# Patient Record
Sex: MALE | Race: WHITE | Employment: OTHER | URBAN - METROPOLITAN AREA
[De-identification: names, ages, dates, MRNs, and addresses within clinical notes are randomized per-mention and may not be internally consistent; named-entity substitution may affect disease eponyms.]

---

## 2019-06-27 ENCOUNTER — OFFICE VISIT (OUTPATIENT)
Dept: ENDOCRINOLOGY CLINIC | Facility: CLINIC | Age: 59
End: 2019-06-27
Payer: COMMERCIAL

## 2019-06-27 VITALS — WEIGHT: 184.38 LBS | SYSTOLIC BLOOD PRESSURE: 122 MMHG | DIASTOLIC BLOOD PRESSURE: 57 MMHG | HEART RATE: 95 BPM

## 2019-06-27 DIAGNOSIS — E10.65 UNCONTROLLED TYPE 1 DIABETES MELLITUS WITH HYPERGLYCEMIA (HCC): Primary | ICD-10-CM

## 2019-06-27 PROBLEM — E11.65 UNCONTROLLED TYPE 2 DIABETES MELLITUS WITH HYPERGLYCEMIA (HCC): Status: ACTIVE | Noted: 2019-06-27

## 2019-06-27 PROCEDURE — 36416 COLLJ CAPILLARY BLOOD SPEC: CPT | Performed by: INTERNAL MEDICINE

## 2019-06-27 PROCEDURE — 83036 HEMOGLOBIN GLYCOSYLATED A1C: CPT | Performed by: INTERNAL MEDICINE

## 2019-06-27 PROCEDURE — 82962 GLUCOSE BLOOD TEST: CPT | Performed by: INTERNAL MEDICINE

## 2019-06-27 PROCEDURE — 99205 OFFICE O/P NEW HI 60 MIN: CPT | Performed by: INTERNAL MEDICINE

## 2019-06-27 RX ORDER — HUMAN INSULIN 100 [IU]/ML
INJECTION, SOLUTION SUBCUTANEOUS
Refills: 6 | COMMUNITY
Start: 2019-04-30 | End: 2019-06-27

## 2019-06-27 RX ORDER — GEMFIBROZIL 600 MG/1
600 TABLET, FILM COATED ORAL
Qty: 180 TABLET | Refills: 1 | Status: SHIPPED | OUTPATIENT
Start: 2019-06-27 | End: 2019-10-25

## 2019-06-27 RX ORDER — HUMAN INSULIN 100 [IU]/ML
40 INJECTION, SUSPENSION SUBCUTANEOUS NIGHTLY
Refills: 6 | COMMUNITY
Start: 2019-04-27 | End: 2019-06-27

## 2019-06-27 RX ORDER — FLASH GLUCOSE SENSOR
1 KIT MISCELLANEOUS CONTINUOUS
Qty: 2 EACH | Refills: 5 | Status: SHIPPED | OUTPATIENT
Start: 2019-06-27 | End: 2021-07-16

## 2019-06-27 RX ORDER — SYRINGE-NEEDLE,INSULIN,0.5 ML 29 GAUGE
SYRINGE, EMPTY DISPOSABLE MISCELLANEOUS
Refills: 0 | COMMUNITY
Start: 2019-02-26 | End: 2019-06-27

## 2019-06-27 RX ORDER — GLIMEPIRIDE 4 MG/1
4 TABLET ORAL
COMMUNITY
End: 2019-06-27

## 2019-06-27 RX ORDER — GEMFIBROZIL 600 MG/1
600 TABLET, FILM COATED ORAL
COMMUNITY
End: 2019-06-27

## 2019-06-27 RX ORDER — MINOCYCLINE HYDROCHLORIDE 100 MG/1
100 CAPSULE ORAL 2 TIMES DAILY
COMMUNITY

## 2019-06-27 RX ORDER — IRBESARTAN 150 MG/1
150 TABLET ORAL NIGHTLY
COMMUNITY
End: 2019-06-27

## 2019-06-28 ENCOUNTER — TELEPHONE (OUTPATIENT)
Dept: ENDOCRINOLOGY CLINIC | Facility: CLINIC | Age: 59
End: 2019-06-28

## 2019-06-28 NOTE — PATIENT INSTRUCTIONS
STOP Metformin, Regular, NPH    Tresiba 25 units SQ QHS    Novolog  INSULIN SLIDING SCALE  Base Values  Breakfast: 12  Lunch: 12  Dinner: 16  Ranges:  80-99: -2  100-119: 0  120-139: 0  140-159: 0  160-179: 1  180-199: 1  200-219: 1  220-239: 2  240-259: 2

## 2019-06-28 NOTE — TELEPHONE ENCOUNTER
LMTCB -Called pt to inquire how Verna Castillo was working. SH would like him to connect w/ clinic.  Instructions provided at [address] [place] [address]

## 2019-06-28 NOTE — PROGRESS NOTES
Name: Kristina Up  Date: 6/27/2019    Referring Physician: No ref. provider found    HISTORY OF PRESENT ILLNESS   Kristina Up is a 60year old male who presents for diabetes mellitus diagnosed in 1997.   He was diagnosed with diabetes after severe pancreati HCl 100 MG Oral Cap, Take 100 mg by mouth 2 (two) times daily. , Disp: , Rfl:   •  Pancrelipase, Lip-Prot-Amyl, (CREON) 6000 units Oral Cap DR Particles, Take by mouth as needed. , Disp: , Rfl:      Allergies:     Radiology Contrast *    HIVES  Sulfur-Salicy importance of low CHO diet, recommend 45gm per meal or 135gm per day  -Provided patient education materials  -Provided blood glucose log book  -d/c NPH, Regular and Metformin  -Start Tresiba 25 units SQ QHS  -Start Novolog 12-12-16 units SQ TID with meals

## 2019-07-10 NOTE — TELEPHONE ENCOUNTER
Dr Taylor Sensing [facility]) called w/ update    Not wearing Dara Pro - fell of after 36 hrs. Pt is  and sweats at his job. Pt is back to using Insulin R and Insulin N OTC.    [facility]ospitals in [place] states [place] and Novolog insulin cost over $400 so he is not doing it

## 2019-07-11 NOTE — TELEPHONE ENCOUNTER
Lets at least call the pharmacy to try running the coupon card or is there an alternative that is better covered? If the insulin is affordable then there won't be as many barriers to take the medication. Thanks.

## 2019-07-12 RX ORDER — INSULIN GLARGINE 100 [IU]/ML
25 INJECTION, SOLUTION SUBCUTANEOUS NIGHTLY
Qty: 22.5 ML | Refills: 0 | Status: SHIPPED | OUTPATIENT
Start: 2019-07-12 | End: 2019-07-12

## 2019-07-12 NOTE — TELEPHONE ENCOUNTER
Pharmacy returned call. Lantus costs $557  Can dispense Basaglar and use copay card. Pharmacy will call patient about activating copay card.

## 2019-07-12 NOTE — TELEPHONE ENCOUNTER
Becca Shipley confirmed Novolog is $60 for 90 day supply. [place] more costly at $176 for 60 day supply. RN sent over alternative long acting insulin to see if we can get it less expensive for patient.     Will await karlene back from pharmacy

## 2019-08-07 ENCOUNTER — TELEPHONE (OUTPATIENT)
Dept: ENDOCRINOLOGY CLINIC | Facility: CLINIC | Age: 59
End: 2019-08-07

## 2019-08-07 NOTE — TELEPHONE ENCOUNTER
Pt would like to know if he should push his appt back a little due to new medication ? States instead of sep should he be seen in October ? Pt states novalog and lipitor are working beautifully together.      12/12/18 is how he is taking   Lipitor 25-30

## 2019-08-09 NOTE — TELEPHONE ENCOUNTER
I'm confused - maybe he means lantus and Novolog? I'm glad it is working well and ok to move appt to October since he started medications later. Thanks.

## 2019-08-09 NOTE — TELEPHONE ENCOUNTER
Spoke with pt confirmed he mean Lantus and Novolog. Relayed Dr. Radu Ochoa message. Appt rescheduled to 10/25/19 at 0800. Pt agreed to appt scheduled. No further action required at this time.

## 2019-08-22 ENCOUNTER — TELEPHONE (OUTPATIENT)
Dept: ENDOCRINOLOGY CLINIC | Facility: CLINIC | Age: 59
End: 2019-08-22

## 2019-08-22 NOTE — TELEPHONE ENCOUNTER
RN spoke with patient who confirms he is still taking Lantus and Novolog and doing \"good\" Tanja Matson is does what he is supposed to\". Pt states he \"ate like a pig\" and sugar this morning was 343. PT confirms he corrected w/ Novolog this morning.   VY Arita

## 2019-10-21 ENCOUNTER — LAB ENCOUNTER (OUTPATIENT)
Dept: LAB | Facility: HOSPITAL | Age: 59
End: 2019-10-21
Attending: INTERNAL MEDICINE
Payer: COMMERCIAL

## 2019-10-21 DIAGNOSIS — E10.65 UNCONTROLLED TYPE 1 DIABETES MELLITUS WITH HYPERGLYCEMIA (HCC): ICD-10-CM

## 2019-10-21 PROCEDURE — 85025 COMPLETE CBC W/AUTO DIFF WBC: CPT

## 2019-10-21 PROCEDURE — 80053 COMPREHEN METABOLIC PANEL: CPT

## 2019-10-21 PROCEDURE — 36415 COLL VENOUS BLD VENIPUNCTURE: CPT

## 2019-10-21 PROCEDURE — 82043 UR ALBUMIN QUANTITATIVE: CPT

## 2019-10-21 PROCEDURE — 84443 ASSAY THYROID STIM HORMONE: CPT

## 2019-10-21 PROCEDURE — 80061 LIPID PANEL: CPT

## 2019-10-21 PROCEDURE — 82570 ASSAY OF URINE CREATININE: CPT

## 2019-10-25 ENCOUNTER — OFFICE VISIT (OUTPATIENT)
Dept: ENDOCRINOLOGY CLINIC | Facility: CLINIC | Age: 59
End: 2019-10-25
Payer: COMMERCIAL

## 2019-10-25 VITALS — HEART RATE: 79 BPM | DIASTOLIC BLOOD PRESSURE: 80 MMHG | WEIGHT: 174 LBS | SYSTOLIC BLOOD PRESSURE: 140 MMHG

## 2019-10-25 DIAGNOSIS — E10.65 UNCONTROLLED TYPE 1 DIABETES MELLITUS WITH HYPERGLYCEMIA (HCC): Primary | ICD-10-CM

## 2019-10-25 PROCEDURE — 36416 COLLJ CAPILLARY BLOOD SPEC: CPT | Performed by: INTERNAL MEDICINE

## 2019-10-25 PROCEDURE — 82962 GLUCOSE BLOOD TEST: CPT | Performed by: INTERNAL MEDICINE

## 2019-10-25 PROCEDURE — 83036 HEMOGLOBIN GLYCOSYLATED A1C: CPT | Performed by: INTERNAL MEDICINE

## 2019-10-25 PROCEDURE — 99214 OFFICE O/P EST MOD 30 MIN: CPT | Performed by: INTERNAL MEDICINE

## 2019-10-25 RX ORDER — GEMFIBROZIL 600 MG/1
600 TABLET, FILM COATED ORAL
Qty: 180 TABLET | Refills: 1 | Status: SHIPPED | OUTPATIENT
Start: 2019-10-25 | End: 2021-07-16

## 2019-10-25 NOTE — PATIENT INSTRUCTIONS
Increase Basaglar to 35 units SQ daily    Continue Novolog 12 units SQ with breakfast, lunch and 20 units SQ dinner

## 2019-10-25 NOTE — PROGRESS NOTES
Name: Maureen Paul  Date: 10/25/2019    Referring Physician: No ref. provider found    HISTORY OF PRESENT ILLNESS   Maureen Paul is a 60year old male who presents for diabetes mellitus diagnosed in 1997.   He was diagnosed with diabetes after severe pancreat 12 Units into the skin 3 (three) times daily before meals. (Patient taking differently: Inject 12 Units into the skin 3 (three) times daily before meals.  Pt adjusts insulin depending on food. ), Disp: 30 mL, Rfl: 3  •  Insulin Pen Needle (BD PEN NEEDLE NAN Uncontrolled  -Uncontrolled, HgA1c 9.4% -->significantly improved   -Discussed importance of glycemic control to prevent complications of diabetes  -Discussed complications of diabetes include retinopathy, neuropathy, nephropathy and cardiovascular disease

## 2020-01-21 ENCOUNTER — TELEPHONE (OUTPATIENT)
Dept: ENDOCRINOLOGY CLINIC | Facility: CLINIC | Age: 60
End: 2020-01-21

## 2020-01-21 RX ORDER — INSULIN GLARGINE 100 [IU]/ML
35 INJECTION, SOLUTION SUBCUTANEOUS NIGHTLY
Qty: 33 ML | Refills: 0 | Status: SHIPPED | OUTPATIENT
Start: 2020-01-21 | End: 2020-01-23

## 2020-01-22 ENCOUNTER — TELEPHONE (OUTPATIENT)
Dept: ENDOCRINOLOGY CLINIC | Facility: CLINIC | Age: 60
End: 2020-01-22

## 2020-01-22 RX ORDER — INSULIN GLARGINE 100 [IU]/ML
35 INJECTION, SOLUTION SUBCUTANEOUS NIGHTLY
Qty: 33 ML | Refills: 0 | Status: SHIPPED | OUTPATIENT
Start: 2020-01-22 | End: 2020-01-23

## 2020-01-22 NOTE — TELEPHONE ENCOUNTER
Basaglar too expensive.  [facility], per patient insurance preferred brand are Lantus and Toujeo and Levemir  Please advise?

## 2020-01-22 NOTE — TELEPHONE ENCOUNTER
Pt states that I Just Shared will cost $920.07 and he can't afford. Please advise if something can be substituted.

## 2020-01-22 NOTE — TELEPHONE ENCOUNTER
LOV; 10/25/19    PLEASE ADVISE:    CURRENTLY on Basaglar 35 u nightly   Ins prefers Lantus . Is this ok?

## 2020-01-22 NOTE — TELEPHONE ENCOUNTER
Current Outpatient Medications   Medication Sig Dispense Refill   • BASAGLAR KWIKPEN 100 UNIT/ML Subcutaneous Solution Pen-injector Inject 35 Units into the skin nightly.  33 mL 0     Per pharmacy Insurance prefers Lantus pls clarify

## 2020-01-23 RX ORDER — INSULIN GLARGINE 300 U/ML
35 INJECTION, SOLUTION SUBCUTANEOUS DAILY
Qty: 13.5 ML | Refills: 0 | Status: SHIPPED | OUTPATIENT
Start: 2020-01-23 | End: 2020-02-28

## 2020-01-23 NOTE — TELEPHONE ENCOUNTER
Awais Wyman sent per [facility] written. Pharm states cost is +$700. Pt may have deducible w/ commercial insurance - will try savings card. RN advised pt to use savings card (info given over phone) by calling number on card to activate.  Pt is agreeable and stat

## 2020-01-30 NOTE — TELEPHONE ENCOUNTER
New Toujeo Max coupon card was activated today by pharm. Pharm will call pt to p/u when ready.     Routed to [facility] as Randa Gan

## 2020-02-28 ENCOUNTER — OFFICE VISIT (OUTPATIENT)
Dept: ENDOCRINOLOGY CLINIC | Facility: CLINIC | Age: 60
End: 2020-02-28
Payer: COMMERCIAL

## 2020-02-28 VITALS — WEIGHT: 177 LBS | HEART RATE: 75 BPM | DIASTOLIC BLOOD PRESSURE: 71 MMHG | SYSTOLIC BLOOD PRESSURE: 133 MMHG

## 2020-02-28 DIAGNOSIS — E10.65 UNCONTROLLED TYPE 1 DIABETES MELLITUS WITH HYPERGLYCEMIA (HCC): Primary | ICD-10-CM

## 2020-02-28 LAB
CARTRIDGE LOT#: ABNORMAL NUMERIC
GLUCOSE BLOOD: 133
HEMOGLOBIN A1C: 11 % (ref 4.3–5.6)
TEST STRIP LOT #: NORMAL NUMERIC

## 2020-02-28 PROCEDURE — 99214 OFFICE O/P EST MOD 30 MIN: CPT | Performed by: INTERNAL MEDICINE

## 2020-02-28 PROCEDURE — 82962 GLUCOSE BLOOD TEST: CPT | Performed by: INTERNAL MEDICINE

## 2020-02-28 PROCEDURE — 36416 COLLJ CAPILLARY BLOOD SPEC: CPT | Performed by: INTERNAL MEDICINE

## 2020-02-28 PROCEDURE — 83036 HEMOGLOBIN GLYCOSYLATED A1C: CPT | Performed by: INTERNAL MEDICINE

## 2020-02-28 NOTE — PROGRESS NOTES
Name: Kristina Up  Date: 2/28/2020    Referring Physician: No ref. provider found    HISTORY OF PRESENT ILLNESS   Kristina Up is a 60year old male who presents for diabetes mellitus diagnosed in 1997.   He was diagnosed with diabetes after severe pancreati Disp: 60 mL, Rfl: 2  •  Minocycline HCl 100 MG Oral Cap, Take 100 mg by mouth 2 (two) times daily. , Disp: , Rfl:   •  Pancrelipase, Lip-Prot-Amyl, (CREON) 6000 units Oral Cap DR Particles, Take by mouth as needed. , Disp: , Rfl:   •  Pancrelipase, Lip-Prot- bilaterally  Cardiovascular:  regular rate, rhythm, , no murmurs, S3 or S4  Gastrointestinal:  normal bowel sounds and no palpable masses in abdomen, organomegaly or tenderness   Musculoskeletal:  normal muscle strength and tone  Skin:  normal moisture and

## 2020-02-28 NOTE — PATIENT INSTRUCTIONS
Check into coverage for mail order pharmacy    Restart Toujeo 35 units SQ bedtime    Novolog  INSULIN SLIDING SCALE  Base Values  Breakfast: 12  Lunch: 12  Dinner: 20  Ranges:  80-99: -2  100-119: 0  120-139: 0  140-159: 1  160-179: 1  180-199: 2  200-219:

## 2020-06-11 NOTE — TELEPHONE ENCOUNTER
Insulin Glargine, 1 Unit Dial, (TOUJEO SOLOSTAR) 300 UNIT/ML Subcutaneous Solution Pen-injector, Inject 35 Units into the skin daily. , Disp: 9 mL, Rfl: 0    REFILL

## 2020-08-28 ENCOUNTER — OFFICE VISIT (OUTPATIENT)
Dept: ENDOCRINOLOGY CLINIC | Facility: CLINIC | Age: 60
End: 2020-08-28
Payer: COMMERCIAL

## 2020-08-28 ENCOUNTER — LAB ENCOUNTER (OUTPATIENT)
Dept: LAB | Facility: HOSPITAL | Age: 60
End: 2020-08-28
Attending: INTERNAL MEDICINE
Payer: COMMERCIAL

## 2020-08-28 ENCOUNTER — TELEPHONE (OUTPATIENT)
Dept: ENDOCRINOLOGY CLINIC | Facility: CLINIC | Age: 60
End: 2020-08-28

## 2020-08-28 VITALS — HEART RATE: 78 BPM | SYSTOLIC BLOOD PRESSURE: 161 MMHG | WEIGHT: 180.63 LBS | DIASTOLIC BLOOD PRESSURE: 98 MMHG

## 2020-08-28 DIAGNOSIS — E10.65 UNCONTROLLED TYPE 1 DIABETES MELLITUS WITH HYPERGLYCEMIA (HCC): Primary | ICD-10-CM

## 2020-08-28 DIAGNOSIS — E10.65 UNCONTROLLED TYPE 1 DIABETES MELLITUS WITH HYPERGLYCEMIA (HCC): ICD-10-CM

## 2020-08-28 LAB
ALBUMIN SERPL-MCNC: 3.3 G/DL (ref 3.4–5)
ALBUMIN/GLOB SERPL: 1 {RATIO} (ref 1–2)
ALP LIVER SERPL-CCNC: 65 U/L (ref 45–117)
ALT SERPL-CCNC: 30 U/L (ref 16–61)
ANION GAP SERPL CALC-SCNC: 3 MMOL/L (ref 0–18)
AST SERPL-CCNC: 23 U/L (ref 15–37)
BILIRUB SERPL-MCNC: 0.4 MG/DL (ref 0.1–2)
BUN BLD-MCNC: 16 MG/DL (ref 7–18)
BUN/CREAT SERPL: 22.9 (ref 10–20)
CALCIUM BLD-MCNC: 8.5 MG/DL (ref 8.5–10.1)
CARTRIDGE LOT#: ABNORMAL NUMERIC
CHLORIDE SERPL-SCNC: 103 MMOL/L (ref 98–112)
CHOLEST SMN-MCNC: 155 MG/DL (ref ?–200)
CO2 SERPL-SCNC: 32 MMOL/L (ref 21–32)
COMPLEXED PSA SERPL-MCNC: 0.57 NG/ML (ref ?–4)
CREAT BLD-MCNC: 0.7 MG/DL (ref 0.7–1.3)
CREAT UR-SCNC: 43.1 MG/DL
DEPRECATED RDW RBC AUTO: 40.2 FL (ref 35.1–46.3)
ERYTHROCYTE [DISTWIDTH] IN BLOOD BY AUTOMATED COUNT: 12.4 % (ref 11–15)
EST. AVERAGE GLUCOSE BLD GHB EST-MCNC: 232 MG/DL (ref 68–126)
GLOBULIN PLAS-MCNC: 3.3 G/DL (ref 2.8–4.4)
GLUCOSE BLD-MCNC: 132 MG/DL (ref 70–99)
GLUCOSE BLOOD: 147
HBA1C MFR BLD HPLC: 9.7 % (ref ?–5.7)
HCT VFR BLD AUTO: 41.4 % (ref 39–53)
HDLC SERPL-MCNC: 34 MG/DL (ref 40–59)
HEMOGLOBIN A1C: 10.1 % (ref 4.3–5.6)
HGB BLD-MCNC: 14.9 G/DL (ref 13–17.5)
LDLC SERPL DIRECT ASSAY-MCNC: 58 MG/DL (ref ?–100)
M PROTEIN MFR SERPL ELPH: 6.6 G/DL (ref 6.4–8.2)
MCH RBC QN AUTO: 31.8 PG (ref 26–34)
MCHC RBC AUTO-ENTMCNC: 36 G/DL (ref 31–37)
MCV RBC AUTO: 88.3 FL (ref 80–100)
MICROALBUMIN UR-MCNC: 14.9 MG/DL
MICROALBUMIN/CREAT 24H UR-RTO: 345.7 UG/MG (ref ?–30)
NONHDLC SERPL-MCNC: 121 MG/DL (ref ?–130)
OSMOLALITY SERPL CALC.SUM OF ELEC: 289 MOSM/KG (ref 275–295)
PATIENT FASTING Y/N/NP: YES
PATIENT FASTING Y/N/NP: YES
PLATELET # BLD AUTO: 247 10(3)UL (ref 150–450)
POTASSIUM SERPL-SCNC: 4.7 MMOL/L (ref 3.5–5.1)
RBC # BLD AUTO: 4.69 X10(6)UL (ref 4.3–5.7)
SODIUM SERPL-SCNC: 138 MMOL/L (ref 136–145)
TEST STRIP LOT #: NORMAL NUMERIC
TRIGL SERPL-MCNC: 438 MG/DL (ref 30–149)
TSI SER-ACNC: 1.19 MIU/ML (ref 0.36–3.74)
WBC # BLD AUTO: 8.1 X10(3) UL (ref 4–11)

## 2020-08-28 PROCEDURE — 80053 COMPREHEN METABOLIC PANEL: CPT

## 2020-08-28 PROCEDURE — 3077F SYST BP >= 140 MM HG: CPT | Performed by: INTERNAL MEDICINE

## 2020-08-28 PROCEDURE — 83036 HEMOGLOBIN GLYCOSYLATED A1C: CPT

## 2020-08-28 PROCEDURE — 36415 COLL VENOUS BLD VENIPUNCTURE: CPT

## 2020-08-28 PROCEDURE — 85027 COMPLETE CBC AUTOMATED: CPT

## 2020-08-28 PROCEDURE — 83721 ASSAY OF BLOOD LIPOPROTEIN: CPT

## 2020-08-28 PROCEDURE — 80061 LIPID PANEL: CPT

## 2020-08-28 PROCEDURE — 82962 GLUCOSE BLOOD TEST: CPT | Performed by: INTERNAL MEDICINE

## 2020-08-28 PROCEDURE — 82043 UR ALBUMIN QUANTITATIVE: CPT

## 2020-08-28 PROCEDURE — 36416 COLLJ CAPILLARY BLOOD SPEC: CPT | Performed by: INTERNAL MEDICINE

## 2020-08-28 PROCEDURE — 99214 OFFICE O/P EST MOD 30 MIN: CPT | Performed by: INTERNAL MEDICINE

## 2020-08-28 PROCEDURE — 3080F DIAST BP >= 90 MM HG: CPT | Performed by: INTERNAL MEDICINE

## 2020-08-28 PROCEDURE — 82570 ASSAY OF URINE CREATININE: CPT

## 2020-08-28 PROCEDURE — 83036 HEMOGLOBIN GLYCOSYLATED A1C: CPT | Performed by: INTERNAL MEDICINE

## 2020-08-28 PROCEDURE — 84443 ASSAY THYROID STIM HORMONE: CPT

## 2020-08-28 RX ORDER — LOSARTAN POTASSIUM 25 MG/1
25 TABLET ORAL DAILY
Qty: 90 TABLET | Refills: 1 | Status: SHIPPED | OUTPATIENT
Start: 2020-08-28 | End: 2021-01-15

## 2020-08-28 NOTE — PATIENT INSTRUCTIONS
Continue Novolog 20 units SQ with meals    Change NPH to 15 units SQ in the morning and 35 units SQ night

## 2020-08-28 NOTE — PROGRESS NOTES
Name: Dinora Rosa  Date: 8/28/2020    Referring Physician: No ref. provider found    HISTORY OF PRESENT ILLNESS   Dinora Rosa is a 63year old male who presents for diabetes mellitus diagnosed in 1997.   He was diagnosed with diabetes after severe pancreati apply Misc, 1 Device by Does not apply route continuous. , Disp: 2 each, Rfl: 5  •  Insulin Glargine, 1 Unit Dial, (TOUJEO SOLOSTAR) 300 UNIT/ML Subcutaneous Solution Pen-injector, Inject 35 Units into the skin daily.  (Patient not taking: Reported on 8/28/2 scalp hair     Hematologic:  no excessive bruising  Neuro:  sensory grossly intact and motor grossly intact  Psychiatric:  oriented to time, self, and place  Nutritional:  no abnormal weight gain or loss  Feet: Normal monofilament exam, 2+ DP, PT pulses, m

## 2020-08-28 NOTE — TELEPHONE ENCOUNTER
Please call patient - good news, labs demonstrate normal kidney and liver function. Normal PSA, normal thyroid function. However he does have some increased protein in the urine which is first symptoms that diabetes is affecting the kidney.   The new losar

## 2020-08-28 NOTE — TELEPHONE ENCOUNTER
rn called patient with message by dr Gladis Aguilar.  Patient verbalized understanding but states he stopped the gemfibrozil after previous test since it was normal (did not see this anywhere in chart)  Please advise

## 2020-08-29 NOTE — TELEPHONE ENCOUNTER
Ok, noted. Please ask him to restart gemfibrozil since this medicine is keeping the triglyceride level normal. Thanks.

## 2020-08-31 NOTE — TELEPHONE ENCOUNTER
Pt verbalized understanding to restart Gmfibrozil 600 mg daily. Pt states he will take in morning and does not need refill at this time. No further questions.

## 2020-09-29 RX ORDER — INSULIN ASPART 100 [IU]/ML
INJECTION, SOLUTION INTRAVENOUS; SUBCUTANEOUS
Qty: 60 ML | Refills: 0 | Status: SHIPPED | OUTPATIENT
Start: 2020-09-29 | End: 2021-01-15

## 2021-01-15 ENCOUNTER — OFFICE VISIT (OUTPATIENT)
Dept: ENDOCRINOLOGY CLINIC | Facility: CLINIC | Age: 61
End: 2021-01-15
Payer: COMMERCIAL

## 2021-01-15 VITALS — HEART RATE: 77 BPM | SYSTOLIC BLOOD PRESSURE: 173 MMHG | WEIGHT: 187 LBS | DIASTOLIC BLOOD PRESSURE: 86 MMHG

## 2021-01-15 DIAGNOSIS — E10.65 UNCONTROLLED TYPE 1 DIABETES MELLITUS WITH HYPERGLYCEMIA (HCC): Primary | ICD-10-CM

## 2021-01-15 LAB
CARTRIDGE LOT#: ABNORMAL NUMERIC
GLUCOSE BLOOD: 152
HEMOGLOBIN A1C: 9.9 % (ref 4.3–5.6)
TEST STRIP LOT #: NORMAL NUMERIC

## 2021-01-15 PROCEDURE — 83036 HEMOGLOBIN GLYCOSYLATED A1C: CPT | Performed by: INTERNAL MEDICINE

## 2021-01-15 PROCEDURE — 3077F SYST BP >= 140 MM HG: CPT | Performed by: INTERNAL MEDICINE

## 2021-01-15 PROCEDURE — 36416 COLLJ CAPILLARY BLOOD SPEC: CPT | Performed by: INTERNAL MEDICINE

## 2021-01-15 PROCEDURE — 82947 ASSAY GLUCOSE BLOOD QUANT: CPT | Performed by: INTERNAL MEDICINE

## 2021-01-15 PROCEDURE — 99214 OFFICE O/P EST MOD 30 MIN: CPT | Performed by: INTERNAL MEDICINE

## 2021-01-15 PROCEDURE — 3079F DIAST BP 80-89 MM HG: CPT | Performed by: INTERNAL MEDICINE

## 2021-01-15 RX ORDER — INSULIN DEGLUDEC 200 U/ML
35 INJECTION, SOLUTION SUBCUTANEOUS NIGHTLY
Qty: 30 ML | Refills: 2 | Status: SHIPPED | OUTPATIENT
Start: 2021-01-15 | End: 2021-07-16

## 2021-01-15 RX ORDER — INSULIN ASPART 100 [IU]/ML
20 INJECTION, SOLUTION INTRAVENOUS; SUBCUTANEOUS
Qty: 60 ML | Refills: 0 | Status: SHIPPED | OUTPATIENT
Start: 2021-01-15 | End: 2021-07-16

## 2021-01-15 RX ORDER — LOSARTAN POTASSIUM 50 MG/1
50 TABLET ORAL DAILY
Qty: 90 TABLET | Refills: 1 | Status: SHIPPED | OUTPATIENT
Start: 2021-01-15 | End: 2021-07-16

## 2021-01-15 NOTE — PATIENT INSTRUCTIONS
IF covered  Tresiba 35 units subcutaneous at bedtime STOP NPH    IF NOT covered - CHANGE NPH (N) insulin to 20 units subcutaneous QAM, 50 units subcutaneous QPM    CONTINUE Novolog 20 units subcutaneous 2 times per day    START Gemfibrozil 600mg 2 t

## 2021-01-15 NOTE — PROGRESS NOTES
Name: Maddy Bruno  Date: 1/15/2021    Referring Physician: No ref. provider found    HISTORY OF PRESENT ILLNESS   Maddy Bruno is a 63year old male who presents for diabetes mellitus diagnosed in 1997.   He was diagnosed with diabetes after severe pancreati 42 Units daily. , Disp: , Rfl:   •  gemfibrozil 600 MG Oral Tab, Take 1 tablet (600 mg total) by mouth 2 (two) times daily before meals. , Disp: 180 tablet, Rfl: 1  •  Minocycline HCl 100 MG Oral Cap, Take 100 mg by mouth 2 (two) times daily. , Disp: , Rfl: place  Nutritional:  no abnormal weight gain or loss    ASSESSMENT/PLAN:      1.  Diabetes Mellitus Type 1, Uncontrolled  -Uncontrolled, HgA1c 9.9%   -Unfortunately sugars are not well controlled in the afternoon when maintained on NPH   -However Basaglar/l

## 2021-05-05 ENCOUNTER — IMMUNIZATION (OUTPATIENT)
Dept: LAB | Facility: HOSPITAL | Age: 61
End: 2021-05-05
Attending: HOSPITALIST
Payer: COMMERCIAL

## 2021-05-05 DIAGNOSIS — Z23 NEED FOR VACCINATION: Primary | ICD-10-CM

## 2021-05-05 PROCEDURE — 0001A SARSCOV2 VAC 30MCG/0.3ML IM: CPT

## 2021-05-26 ENCOUNTER — IMMUNIZATION (OUTPATIENT)
Dept: LAB | Facility: HOSPITAL | Age: 61
End: 2021-05-26
Attending: EMERGENCY MEDICINE
Payer: COMMERCIAL

## 2021-05-26 DIAGNOSIS — Z23 NEED FOR VACCINATION: Primary | ICD-10-CM

## 2021-05-26 PROCEDURE — 0002A SARSCOV2 VAC 30MCG/0.3ML IM: CPT

## 2021-07-16 ENCOUNTER — OFFICE VISIT (OUTPATIENT)
Dept: ENDOCRINOLOGY CLINIC | Facility: CLINIC | Age: 61
End: 2021-07-16
Payer: COMMERCIAL

## 2021-07-16 VITALS — HEART RATE: 71 BPM | DIASTOLIC BLOOD PRESSURE: 66 MMHG | SYSTOLIC BLOOD PRESSURE: 136 MMHG | WEIGHT: 182.81 LBS

## 2021-07-16 DIAGNOSIS — E10.65 UNCONTROLLED TYPE 1 DIABETES MELLITUS WITH HYPERGLYCEMIA (HCC): Primary | ICD-10-CM

## 2021-07-16 LAB
CARTRIDGE LOT#: ABNORMAL NUMERIC
GLUCOSE BLOOD: 110
HEMOGLOBIN A1C: 10.1 % (ref 4.3–5.6)
TEST STRIP LOT #: NORMAL NUMERIC

## 2021-07-16 PROCEDURE — 3078F DIAST BP <80 MM HG: CPT | Performed by: INTERNAL MEDICINE

## 2021-07-16 PROCEDURE — 82947 ASSAY GLUCOSE BLOOD QUANT: CPT | Performed by: INTERNAL MEDICINE

## 2021-07-16 PROCEDURE — 83036 HEMOGLOBIN GLYCOSYLATED A1C: CPT | Performed by: INTERNAL MEDICINE

## 2021-07-16 PROCEDURE — 99214 OFFICE O/P EST MOD 30 MIN: CPT | Performed by: INTERNAL MEDICINE

## 2021-07-16 PROCEDURE — 3075F SYST BP GE 130 - 139MM HG: CPT | Performed by: INTERNAL MEDICINE

## 2021-07-16 PROCEDURE — 36416 COLLJ CAPILLARY BLOOD SPEC: CPT | Performed by: INTERNAL MEDICINE

## 2021-07-16 PROCEDURE — 3046F HEMOGLOBIN A1C LEVEL >9.0%: CPT | Performed by: INTERNAL MEDICINE

## 2021-07-16 RX ORDER — INSULIN ASPART 100 [IU]/ML
20 INJECTION, SOLUTION INTRAVENOUS; SUBCUTANEOUS
Qty: 60 ML | Refills: 1 | Status: SHIPPED | OUTPATIENT
Start: 2021-07-16 | End: 2021-12-29

## 2021-07-16 RX ORDER — FENOFIBRATE 134 MG/1
134 CAPSULE ORAL DAILY
Qty: 90 CAPSULE | Refills: 1 | Status: SHIPPED | OUTPATIENT
Start: 2021-07-16 | End: 2021-08-15

## 2021-07-16 RX ORDER — LOSARTAN POTASSIUM 25 MG/1
25 TABLET ORAL DAILY
Qty: 90 TABLET | Refills: 1 | Status: SHIPPED | OUTPATIENT
Start: 2021-07-16 | End: 2022-01-31

## 2021-07-16 RX ORDER — INSULIN DEGLUDEC 200 U/ML
55 INJECTION, SOLUTION SUBCUTANEOUS NIGHTLY
Qty: 60 ML | Refills: 3 | Status: SHIPPED | OUTPATIENT
Start: 2021-07-16

## 2021-07-16 NOTE — PROGRESS NOTES
Name: Quinten Barreto  Date: 7/16/2021    Referring Physician: No ref. provider found    HISTORY OF PRESENT ILLNESS   Quinten Barreto is a 57year old male who presents for diabetes mellitus diagnosed in 1997.   He was diagnosed with diabetes after severe pancreati (TOUJEO SOLOSTAR) 300 UNIT/ML Subcutaneous Solution Pen-injector, Inject 35 Units into the skin daily. , Disp: 9 mL, Rfl: 0  •  Insulin Regular Human (NOVOLIN R) 100 UNIT/ML Injection Solution, 42 Units daily. , Disp: , Rfl:   •  gemfibrozil 600 MG Oral Tab, Nails:  normal scalp hair     Hematologic:  no excessive bruising  Neuro:  sensory grossly intact and motor grossly intact  Psychiatric:  oriented to time, self, and place  Nutritional:  no abnormal weight gain or loss    ASSESSMENT/PLAN:      1.  Diabetes

## 2021-07-16 NOTE — PATIENT INSTRUCTIONS
INCREASE Tresiba to 55 units subcutaneous daily    INCREASE Novolog 14 units subcutaneous breakfast; 10 units subcutaneous lunch and 28 units subcutaneous dinner

## 2021-11-26 ENCOUNTER — TELEPHONE (OUTPATIENT)
Dept: ENDOCRINOLOGY CLINIC | Facility: CLINIC | Age: 61
End: 2021-11-26

## 2021-11-26 NOTE — TELEPHONE ENCOUNTER
Dr. Dmitri Sanchez, patient's wife sent the following message regarding patient:  \"I hope that you and your family had a very nice Thanksgiving!!!  I am sending this message for Susannah Winters he is taking fenofibrate daily.   He is still having major leg cramps in the middl

## 2021-11-29 NOTE — TELEPHONE ENCOUNTER
Ok, noted. Update from his wife. Agree with plan to stop Fenofibrate and lets restart Gemfibrozil 600mg PO BID #180, refill 1. Thanks.

## 2021-12-03 RX ORDER — GEMFIBROZIL 600 MG/1
600 TABLET, FILM COATED ORAL
Qty: 180 TABLET | Refills: 1 | Status: SHIPPED | OUTPATIENT
Start: 2021-12-03 | End: 2021-12-03

## 2021-12-03 RX ORDER — GEMFIBROZIL 600 MG/1
600 TABLET, FILM COATED ORAL 2 TIMES DAILY
Qty: 180 TABLET | Refills: 1 | Status: SHIPPED | OUTPATIENT
Start: 2021-12-03

## 2021-12-28 NOTE — TELEPHONE ENCOUNTER
•  NOVOLOG FLEXPEN 100 UNIT/ML Subcutaneous Solution Pen-injector, Inject 20 Units into the skin 3 (three) times daily before meals. , Disp: 60 mL, Rfl: 1

## 2021-12-30 RX ORDER — INSULIN ASPART 100 [IU]/ML
20 INJECTION, SOLUTION INTRAVENOUS; SUBCUTANEOUS
Qty: 60 ML | Refills: 0 | Status: SHIPPED | OUTPATIENT
Start: 2021-12-30

## 2022-01-03 ENCOUNTER — TELEPHONE (OUTPATIENT)
Dept: ENDOCRINOLOGY CLINIC | Facility: CLINIC | Age: 62
End: 2022-01-03

## 2022-01-03 DIAGNOSIS — E10.65 UNCONTROLLED TYPE 1 DIABETES MELLITUS WITH HYPERGLYCEMIA (HCC): Primary | ICD-10-CM

## 2022-01-03 NOTE — TELEPHONE ENCOUNTER
Pt was sched for 1/21 and did not want to do a video visit, resched to 4/8 and wanted to see if he can get labs done now.  pls advise

## 2022-01-05 NOTE — TELEPHONE ENCOUNTER
Would he like to come after work on Thursday so he can be seen sooner. Ok to add at 6:30 tomorrow or next Thursday. Thanks.

## 2022-01-05 NOTE — TELEPHONE ENCOUNTER
Dr. Lilian Laura, patient is unable to schedule late appt. He can do any early morning. He says he is ok with keeping appt in April.

## 2022-01-11 NOTE — TELEPHONE ENCOUNTER
Spoke to patient regarding Dr. Maribell Salguero note regarding lab work below. Patient verbalized understanding, stating he will get lab work done one week prior to appointment in April.

## 2022-01-31 RX ORDER — LOSARTAN POTASSIUM 25 MG/1
TABLET ORAL
Qty: 90 TABLET | Refills: 0 | Status: SHIPPED | OUTPATIENT
Start: 2022-01-31

## 2022-04-08 ENCOUNTER — LAB ENCOUNTER (OUTPATIENT)
Dept: LAB | Facility: HOSPITAL | Age: 62
End: 2022-04-08
Attending: INTERNAL MEDICINE
Payer: COMMERCIAL

## 2022-04-08 ENCOUNTER — OFFICE VISIT (OUTPATIENT)
Dept: ENDOCRINOLOGY CLINIC | Facility: CLINIC | Age: 62
End: 2022-04-08
Payer: COMMERCIAL

## 2022-04-08 ENCOUNTER — TELEPHONE (OUTPATIENT)
Dept: ENDOCRINOLOGY CLINIC | Facility: CLINIC | Age: 62
End: 2022-04-08

## 2022-04-08 VITALS — DIASTOLIC BLOOD PRESSURE: 78 MMHG | HEART RATE: 76 BPM | SYSTOLIC BLOOD PRESSURE: 163 MMHG | WEIGHT: 186 LBS

## 2022-04-08 DIAGNOSIS — E10.65 UNCONTROLLED TYPE 1 DIABETES MELLITUS WITH HYPERGLYCEMIA (HCC): ICD-10-CM

## 2022-04-08 DIAGNOSIS — E10.65 UNCONTROLLED TYPE 1 DIABETES MELLITUS WITH HYPERGLYCEMIA (HCC): Primary | ICD-10-CM

## 2022-04-08 LAB
ALBUMIN SERPL-MCNC: 3.6 G/DL (ref 3.4–5)
ALBUMIN/GLOB SERPL: 1.2 {RATIO} (ref 1–2)
ALP LIVER SERPL-CCNC: 81 U/L
ALT SERPL-CCNC: 27 U/L
ANION GAP SERPL CALC-SCNC: 4 MMOL/L (ref 0–18)
AST SERPL-CCNC: 20 U/L (ref 15–37)
BILIRUB SERPL-MCNC: 0.2 MG/DL (ref 0.1–2)
BUN BLD-MCNC: 21 MG/DL (ref 7–18)
BUN/CREAT SERPL: 30.9 (ref 10–20)
CALCIUM BLD-MCNC: 8.6 MG/DL (ref 8.5–10.1)
CARTRIDGE LOT#: ABNORMAL NUMERIC
CHLORIDE SERPL-SCNC: 110 MMOL/L (ref 98–112)
CHOLEST SERPL-MCNC: 112 MG/DL (ref ?–200)
CO2 SERPL-SCNC: 28 MMOL/L (ref 21–32)
CREAT BLD-MCNC: 0.68 MG/DL
CREAT UR-SCNC: 62.3 MG/DL
DEPRECATED RDW RBC AUTO: 40.6 FL (ref 35.1–46.3)
ERYTHROCYTE [DISTWIDTH] IN BLOOD BY AUTOMATED COUNT: 12.4 % (ref 11–15)
EST. AVERAGE GLUCOSE BLD GHB EST-MCNC: 258 MG/DL (ref 68–126)
FASTING PATIENT LIPID ANSWER: NO
FASTING STATUS PATIENT QL REPORTED: NO
GLOBULIN PLAS-MCNC: 2.9 G/DL (ref 2.8–4.4)
GLUCOSE BLD-MCNC: 70 MG/DL (ref 70–99)
GLUCOSE BLOOD: 81
HBA1C MFR BLD: 10.6 % (ref ?–5.7)
HCT VFR BLD AUTO: 38.5 %
HDLC SERPL-MCNC: 29 MG/DL (ref 40–59)
HEMOGLOBIN A1C: 10.5 % (ref 4.3–5.6)
HGB BLD-MCNC: 13.1 G/DL
LDLC SERPL CALC-MCNC: 50 MG/DL (ref ?–100)
MCH RBC QN AUTO: 30.3 PG (ref 26–34)
MCHC RBC AUTO-ENTMCNC: 34 G/DL (ref 31–37)
MCV RBC AUTO: 88.9 FL
MICROALBUMIN UR-MCNC: 40.9 MG/DL
MICROALBUMIN/CREAT 24H UR-RTO: 656.5 UG/MG (ref ?–30)
NONHDLC SERPL-MCNC: 83 MG/DL (ref ?–130)
OSMOLALITY SERPL CALC.SUM OF ELEC: 295 MOSM/KG (ref 275–295)
PLATELET # BLD AUTO: 283 10(3)UL (ref 150–450)
POTASSIUM SERPL-SCNC: 5.6 MMOL/L (ref 3.5–5.1)
PROT SERPL-MCNC: 6.5 G/DL (ref 6.4–8.2)
RBC # BLD AUTO: 4.33 X10(6)UL
SODIUM SERPL-SCNC: 142 MMOL/L (ref 136–145)
TEST STRIP LOT #: NORMAL NUMERIC
TRIGL SERPL-MCNC: 198 MG/DL (ref 30–149)
TSI SER-ACNC: 1.19 MIU/ML (ref 0.36–3.74)
VLDLC SERPL CALC-MCNC: 28 MG/DL (ref 0–30)
WBC # BLD AUTO: 8.2 X10(3) UL (ref 4–11)

## 2022-04-08 PROCEDURE — 80061 LIPID PANEL: CPT

## 2022-04-08 PROCEDURE — 3060F POS MICROALBUMINURIA REV: CPT | Performed by: INTERNAL MEDICINE

## 2022-04-08 PROCEDURE — 82043 UR ALBUMIN QUANTITATIVE: CPT

## 2022-04-08 PROCEDURE — 36415 COLL VENOUS BLD VENIPUNCTURE: CPT

## 2022-04-08 PROCEDURE — 3078F DIAST BP <80 MM HG: CPT | Performed by: INTERNAL MEDICINE

## 2022-04-08 PROCEDURE — 3046F HEMOGLOBIN A1C LEVEL >9.0%: CPT | Performed by: INTERNAL MEDICINE

## 2022-04-08 PROCEDURE — 3077F SYST BP >= 140 MM HG: CPT | Performed by: INTERNAL MEDICINE

## 2022-04-08 PROCEDURE — 80053 COMPREHEN METABOLIC PANEL: CPT

## 2022-04-08 PROCEDURE — 36416 COLLJ CAPILLARY BLOOD SPEC: CPT | Performed by: INTERNAL MEDICINE

## 2022-04-08 PROCEDURE — 84443 ASSAY THYROID STIM HORMONE: CPT

## 2022-04-08 PROCEDURE — 83036 HEMOGLOBIN GLYCOSYLATED A1C: CPT

## 2022-04-08 PROCEDURE — 99214 OFFICE O/P EST MOD 30 MIN: CPT | Performed by: INTERNAL MEDICINE

## 2022-04-08 PROCEDURE — 83036 HEMOGLOBIN GLYCOSYLATED A1C: CPT | Performed by: INTERNAL MEDICINE

## 2022-04-08 PROCEDURE — 82570 ASSAY OF URINE CREATININE: CPT

## 2022-04-08 PROCEDURE — 85027 COMPLETE CBC AUTOMATED: CPT

## 2022-04-08 PROCEDURE — 82947 ASSAY GLUCOSE BLOOD QUANT: CPT | Performed by: INTERNAL MEDICINE

## 2022-04-08 RX ORDER — INSULIN ASPART 100 [IU]/ML
20 INJECTION, SOLUTION INTRAVENOUS; SUBCUTANEOUS
Qty: 45 ML | Refills: 1 | Status: SHIPPED | OUTPATIENT
Start: 2022-04-08

## 2022-04-08 RX ORDER — INSULIN DEGLUDEC 200 U/ML
60 INJECTION, SOLUTION SUBCUTANEOUS NIGHTLY
Qty: 45 ML | Refills: 1 | Status: SHIPPED | OUTPATIENT
Start: 2022-04-08

## 2022-04-08 RX ORDER — LOSARTAN POTASSIUM 100 MG/1
100 TABLET ORAL DAILY
Qty: 90 TABLET | Refills: 1 | Status: SHIPPED | OUTPATIENT
Start: 2022-04-08

## 2022-04-08 NOTE — TELEPHONE ENCOUNTER
Spoke to patient's wife to relay message below - wife stated understanding and will advise patient to continue working on his diabetes diet

## 2022-04-08 NOTE — TELEPHONE ENCOUNTER
Please call patient - good news, labs demonstrate normal kidney and liver function. His cholesterol levels are at goal.  He does have increased protein in his urine which is an early sign that blood glucose is affecting the kidney. So please continue to work on improved blood glucose control. Normal thyroid levels. Normal blood count. Thanks.

## 2022-05-13 ENCOUNTER — TELEPHONE (OUTPATIENT)
Dept: ENDOCRINOLOGY CLINIC | Facility: CLINIC | Age: 62
End: 2022-05-13

## 2022-05-13 NOTE — TELEPHONE ENCOUNTER
Tried to call patient's wife but no answer. Left message to call back. Dr. Miguel Altamirano was informed regarding below.

## 2022-05-13 NOTE — TELEPHONE ENCOUNTER
Pt's wife Dickson Rain states Amanda Rivera has been taken to hospital in [place], he bottomed out and was unconscious because sugar so low.  Michelle Rodrigez would like to talk to Dr Tipton call Michelle Rodrigez at [phone]

## 2022-05-13 NOTE — TELEPHONE ENCOUNTER
Arnoldo Driver to discuss update on patient. Katerina Saunders was on a trip to [place] with friends when he was found unresponsive in the early morning. He had gotten up to use the restroom and did not return by bed so therefore found by friend in AM.  EMS was called and found to have BG level 25. Per wife his blood glucose levels had been lower with the heat. He is being admitted and will follow up when family knows further plan.   Discussed with wife that we should reconsider a glucose sensor despite difficulties in the past.

## 2022-05-16 ENCOUNTER — TELEPHONE (OUTPATIENT)
Dept: ENDOCRINOLOGY CLINIC | Facility: CLINIC | Age: 62
End: 2022-05-16

## 2022-05-16 NOTE — TELEPHONE ENCOUNTER
Spoke to patient's wife who asked RN to call and see if patient will come in to meet with CDE or APN. RN called patient and offered him appointment this Friday at 7:15 am and mentioned that it will be free of cost. Patient refused appointment. Patient stated he knows what he did wrong and still continued to refuse appointment. Patient's wife has been updated.

## 2022-05-16 NOTE — TELEPHONE ENCOUNTER
Please call patient or his wife Bharti Beal to schedule visit this week. Ideally with Mere Cee but also ok with Marlyn. Thanks.

## 2022-10-14 ENCOUNTER — OFFICE VISIT (OUTPATIENT)
Dept: ENDOCRINOLOGY CLINIC | Facility: CLINIC | Age: 62
End: 2022-10-14
Payer: COMMERCIAL

## 2022-10-14 VITALS — DIASTOLIC BLOOD PRESSURE: 61 MMHG | WEIGHT: 182 LBS | HEART RATE: 71 BPM | SYSTOLIC BLOOD PRESSURE: 127 MMHG

## 2022-10-14 DIAGNOSIS — I10 HYPERTENSION, UNSPECIFIED TYPE: ICD-10-CM

## 2022-10-14 DIAGNOSIS — E78.5 HYPERLIPIDEMIA, UNSPECIFIED HYPERLIPIDEMIA TYPE: ICD-10-CM

## 2022-10-14 DIAGNOSIS — E10.65 UNCONTROLLED TYPE 1 DIABETES MELLITUS WITH HYPERGLYCEMIA (HCC): Primary | ICD-10-CM

## 2022-10-14 LAB
CARTRIDGE LOT#: ABNORMAL NUMERIC
GLUCOSE BLOOD: 99
HEMOGLOBIN A1C: 10.4 % (ref 4.3–5.6)
TEST STRIP LOT #: NORMAL NUMERIC

## 2022-10-14 PROCEDURE — 3046F HEMOGLOBIN A1C LEVEL >9.0%: CPT | Performed by: INTERNAL MEDICINE

## 2022-10-14 PROCEDURE — 3074F SYST BP LT 130 MM HG: CPT | Performed by: INTERNAL MEDICINE

## 2022-10-14 PROCEDURE — 82947 ASSAY GLUCOSE BLOOD QUANT: CPT | Performed by: INTERNAL MEDICINE

## 2022-10-14 PROCEDURE — 99214 OFFICE O/P EST MOD 30 MIN: CPT | Performed by: INTERNAL MEDICINE

## 2022-10-14 PROCEDURE — 83036 HEMOGLOBIN GLYCOSYLATED A1C: CPT | Performed by: INTERNAL MEDICINE

## 2022-10-14 PROCEDURE — 3078F DIAST BP <80 MM HG: CPT | Performed by: INTERNAL MEDICINE

## 2022-10-14 RX ORDER — GEMFIBROZIL 600 MG/1
600 TABLET, FILM COATED ORAL 2 TIMES DAILY
Qty: 180 TABLET | Refills: 1 | Status: SHIPPED | OUTPATIENT
Start: 2022-10-14

## 2022-10-14 RX ORDER — INSULIN ASPART 100 [IU]/ML
20 INJECTION, SOLUTION INTRAVENOUS; SUBCUTANEOUS
Qty: 30 ML | Refills: 3 | Status: SHIPPED | OUTPATIENT
Start: 2022-10-14

## 2022-10-14 RX ORDER — INSULIN GLARGINE 300 U/ML
60 INJECTION, SOLUTION SUBCUTANEOUS DAILY
Qty: 9 ML | Refills: 0 | Status: SHIPPED | OUTPATIENT
Start: 2022-10-14

## 2022-10-14 RX ORDER — METFORMIN HYDROCHLORIDE 500 MG/1
500 TABLET, EXTENDED RELEASE ORAL 2 TIMES DAILY WITH MEALS
Qty: 180 TABLET | Refills: 1 | Status: SHIPPED | OUTPATIENT
Start: 2022-10-14

## 2022-10-14 RX ORDER — LOSARTAN POTASSIUM 100 MG/1
100 TABLET ORAL DAILY
Qty: 90 TABLET | Refills: 1 | Status: SHIPPED | OUTPATIENT
Start: 2022-10-14

## 2022-10-14 NOTE — PATIENT INSTRUCTIONS
INCREASE NPH to 60 units subcutaneous bedtime     CONTINUE Novolog 20 units subcutaneous 3 times daily

## 2023-04-14 ENCOUNTER — OFFICE VISIT (OUTPATIENT)
Dept: ENDOCRINOLOGY CLINIC | Facility: CLINIC | Age: 63
End: 2023-04-14

## 2023-04-14 DIAGNOSIS — E10.65 UNCONTROLLED TYPE 1 DIABETES MELLITUS WITH HYPERGLYCEMIA (HCC): Primary | ICD-10-CM

## 2023-04-14 LAB
CARTRIDGE LOT#: ABNORMAL NUMERIC
GLUCOSE BLOOD: 139
HEMOGLOBIN A1C: 9.1 % (ref 4.3–5.6)
TEST STRIP LOT #: NORMAL NUMERIC

## 2023-04-14 PROCEDURE — 99214 OFFICE O/P EST MOD 30 MIN: CPT | Performed by: INTERNAL MEDICINE

## 2023-04-14 PROCEDURE — 83036 HEMOGLOBIN GLYCOSYLATED A1C: CPT | Performed by: INTERNAL MEDICINE

## 2023-04-14 PROCEDURE — 82947 ASSAY GLUCOSE BLOOD QUANT: CPT | Performed by: INTERNAL MEDICINE

## 2023-04-14 PROCEDURE — 3046F HEMOGLOBIN A1C LEVEL >9.0%: CPT | Performed by: INTERNAL MEDICINE

## 2023-04-14 RX ORDER — GEMFIBROZIL 600 MG/1
600 TABLET, FILM COATED ORAL 2 TIMES DAILY
Qty: 180 TABLET | Refills: 1 | Status: SHIPPED | OUTPATIENT
Start: 2023-04-14

## 2023-04-14 RX ORDER — INSULIN ASPART 100 [IU]/ML
20 INJECTION, SOLUTION INTRAVENOUS; SUBCUTANEOUS
Qty: 30 ML | Refills: 3 | Status: SHIPPED | OUTPATIENT
Start: 2023-04-14

## 2023-04-14 RX ORDER — LOSARTAN POTASSIUM 100 MG/1
100 TABLET ORAL DAILY
Qty: 90 TABLET | Refills: 1 | Status: SHIPPED | OUTPATIENT
Start: 2023-04-14

## 2023-04-14 RX ORDER — METFORMIN HYDROCHLORIDE 500 MG/1
500 TABLET, EXTENDED RELEASE ORAL 2 TIMES DAILY WITH MEALS
Qty: 180 TABLET | Refills: 1 | Status: SHIPPED | OUTPATIENT
Start: 2023-04-14

## 2023-07-12 ENCOUNTER — TELEPHONE (OUTPATIENT)
Dept: ENDOCRINOLOGY CLINIC | Facility: CLINIC | Age: 63
End: 2023-07-12

## 2023-07-12 NOTE — TELEPHONE ENCOUNTER
Lab orders faxed electronically to [address] [place] [address] [phone]. Called patient to notify.

## 2023-10-19 ENCOUNTER — TELEPHONE (OUTPATIENT)
Dept: ENDOCRINOLOGY CLINIC | Facility: CLINIC | Age: 63
End: 2023-10-19

## 2023-10-19 DIAGNOSIS — E11.65 UNCONTROLLED TYPE 2 DIABETES MELLITUS WITH HYPERGLYCEMIA (HCC): Primary | ICD-10-CM

## 2023-10-19 DIAGNOSIS — Z12.5 PROSTATE CANCER SCREENING: ICD-10-CM

## 2023-11-11 RX ORDER — LOSARTAN POTASSIUM 100 MG/1
100 TABLET ORAL DAILY
Qty: 90 TABLET | Refills: 0 | Status: SHIPPED | OUTPATIENT
Start: 2023-11-11

## 2023-11-13 PROCEDURE — 3060F POS MICROALBUMINURIA REV: CPT | Performed by: INTERNAL MEDICINE

## 2023-11-17 ENCOUNTER — TELEPHONE (OUTPATIENT)
Dept: ENDOCRINOLOGY CLINIC | Facility: CLINIC | Age: 63
End: 2023-11-17

## 2023-11-17 ENCOUNTER — OFFICE VISIT (OUTPATIENT)
Dept: ENDOCRINOLOGY CLINIC | Facility: CLINIC | Age: 63
End: 2023-11-17

## 2023-11-17 VITALS — WEIGHT: 193.63 LBS | HEART RATE: 79 BPM | SYSTOLIC BLOOD PRESSURE: 144 MMHG | DIASTOLIC BLOOD PRESSURE: 70 MMHG

## 2023-11-17 DIAGNOSIS — E11.65 UNCONTROLLED TYPE 2 DIABETES MELLITUS WITH HYPERGLYCEMIA (HCC): Primary | ICD-10-CM

## 2023-11-17 LAB
CARTRIDGE LOT#: ABNORMAL NUMERIC
GLUCOSE BLOOD: 106
HEMOGLOBIN A1C: 11.7 % (ref 4.3–5.6)
TEST STRIP LOT #: NORMAL NUMERIC

## 2023-11-17 PROCEDURE — 3046F HEMOGLOBIN A1C LEVEL >9.0%: CPT | Performed by: INTERNAL MEDICINE

## 2023-11-17 PROCEDURE — 82947 ASSAY GLUCOSE BLOOD QUANT: CPT | Performed by: INTERNAL MEDICINE

## 2023-11-17 PROCEDURE — 3078F DIAST BP <80 MM HG: CPT | Performed by: INTERNAL MEDICINE

## 2023-11-17 PROCEDURE — 99214 OFFICE O/P EST MOD 30 MIN: CPT | Performed by: INTERNAL MEDICINE

## 2023-11-17 PROCEDURE — 3077F SYST BP >= 140 MM HG: CPT | Performed by: INTERNAL MEDICINE

## 2023-11-17 PROCEDURE — 83036 HEMOGLOBIN GLYCOSYLATED A1C: CPT | Performed by: INTERNAL MEDICINE

## 2023-11-17 NOTE — TELEPHONE ENCOUNTER
[facility]     PSA ([id number])  They did not receive PSA order (may have been because it was put in under [facility] lab so it did not cross over [address]). He tried adding the test from 11/13/23 sample and states if they are not able to add it they will fax something to the office. He's unable to let RN know if it can be truly added.

## 2023-11-17 NOTE — PROGRESS NOTES
Name: Juan José Jimenez  Date: 11/17/2023    Referring Physician: No ref. provider found    HISTORY OF PRESENT ILLNESS   Juan José Jimenez is a 58year old male who presents for diabetes mellitus diagnosed in 1997. 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       Prior HbA, C or glycohemoglobin were 12.4% 6/2019; 9.4% 10/2019; 11.0% 2/2020; 10.1% 8/2020; 9.9% 1/2021; 10.1% 7/2021; 10.5% 4/2022; 10.4% 10/2022; 9.1% 4/2023; 11.7% POC Today   Dietary compliance: Poor  Exercise: No  Polyuria/polydipsia: No  Blurred vision: No    Episodes of hypoglycemia: Yes, in AM rarely 50s   Blood Glucose:  Checking once daily   Bedtime:     Medications for DM   Novolog 25 units subcutaneous dinner   NPH 60 units subcutaneous Bedtime     Basaglar and Toujeo stopped due to cost   He is not taking Creon. REVIEW OF SYSTEMS  Eyes: Diabetic retinopathy present: No            Most recent visit to eye doctor in last 12 months: No - last visit 2 years     CV: Cardiovascular disease present: No         Hypertension present: No         Hyperlipidemia present: No         Peripheral Vascular Disease present: No    : Nephropathy present: No    Neuro: Neuropathy present: No    Skin: Infection or ulceration: No    Osteoporosis: No    Thyroid disease: No      Medications:     Current Outpatient Medications:     losartan 100 MG Oral Tab, Take 1 tablet (100 mg total) by mouth daily. , Disp: 90 tablet, Rfl: 0    NOVOLOG FLEXPEN 100 UNIT/ML Subcutaneous Solution Pen-injector, Inject 20 Units into the skin 3 (three) times daily before meals. Patient injects 20 units with breakfast, 20 units with lunch, and 40 units with dinner, Disp: 30 mL, Rfl: 3    metFORMIN  MG Oral Tablet 24 Hr, Take 1 tablet (500 mg total) by mouth 2 (two) times daily with meals. , Disp: 180 tablet, Rfl: 1    gemfibrozil 600 MG Oral Tab, Take 1 tablet (600 mg total) by mouth in the morning and 1 tablet (600 mg total) before bedtime. , Disp: 180 tablet, Rfl: 1    Insulin NPH Human, Isophane, (RELION N SC), Inject 20 Units into the skin nightly., Disp: , Rfl:     Insulin Glargine, 2 Unit Dial, (TOUJEO MAX SOLOSTAR) 300 UNIT/ML Subcutaneous Solution Pen-injector, Inject 60 Units into the skin daily. , Disp: 9 mL, Rfl: 0    Minocycline HCl 100 MG Oral Cap, Take 1 capsule (100 mg total) by mouth 2 (two) times daily. , Disp: , Rfl:     Insulin Pen Needle (BD PEN NEEDLE MARTIN U/F) 32G X 4 MM Does not apply Misc, Inject 4 times per day, Disp: 200 each, Rfl: 3     Allergies: Allergies   Allergen Reactions    Radiology Contrast Iodinated Dyes HIVES    Sulfur-Salicylic Acid [Salicylic Acid-Sulfur] HIVES       Social History:   Social History     Socioeconomic History    Marital status:    Tobacco Use    Smoking status: Every Day       Medical History:   Past Medical History:   Diagnosis Date    [facility]        Surgical history:   No past surgical history on file. PHYSICAL EXAM   /70   Pulse 79   Wt 193 lb 9.6 oz (87.8 kg)     General Appearance:  alert, well developed, in no acute distress  Eyes:  normal conjunctivae, sclera. , normal sclera and normal pupils  Ears/Nose/Mouth/Throat/Neck:  no palpable thyroid nodules or cervical lymphadenopathy  Back: no kyphosis or back tenderness  Musculoskeletal:  normal muscle strength and tone  Skin:  normal moisture and skin texture  Hair & Nails:  normal scalp hair     Hematologic:  no excessive bruising  Neuro:  sensory grossly intact and motor grossly intact  Psychiatric:  oriented to time, self, and place  Nutritional:  no abnormal weight gain or loss    ASSESSMENT/PLAN:      1.  Diabetes Mellitus Type 1, Uncontrolled  -Uncontrolled, HgA1c 11.7% -->significant increase    -He is consistent with insulin but no frequent SBGM   -He did not like CGM   -He has transitioned back to NPH insulin due to cost   -Discussed importance of glycemic control to prevent complications of diabetes  -Discussed complications of diabetes include retinopathy, neuropathy, nephropathy and cardiovascular disease  -Discussed importance of SBGM  -Continue NPH 60 units subcutaneous at bedtime   -Continue Novolog but discussed timing of insulin before meals and NOT after   -Discussed adding back breakfast dose which he has been skipping  -He is taking large dose of Novolog at bedtime causing AM hypoglycemia, discussed at length importance of taking before dinner  -Change Novolog 10 units subcutaneous QAM; 20 units subcutaneous before dinner  -Verbalized understanding of risks and benefits  -He didn't like CGM   -Normotensive, continue losartan   -Normal lipids   -Normal foot exam performed today   -Recheck MAB, PSA, CBC before next visit   -Discussed Colonoscopy but he declined   -Discussed new anemia and again need for fecal occult blood vs. Colonoscopy but he declined     2. Hypertriglyceridemia  -Continue Gemfibrozil   -Recheck lipids   -Lipids controlled     3.  Hypertension  -Continue Losartan 100mg PO daily  -Discussed importance of taking medication for renal protection     RTC 6 months (due to trouble affording visits)    11/17/2023  Charlynn Fothergill, MD

## 2023-11-17 NOTE — PATIENT INSTRUCTIONS
Iron 65mg 3 times per week     CONTINUE NPH 60 units at 9pm    Change Novolog to 10 units subcutaneous in AM and 20 units subcutaneous before dinner

## 2023-11-17 NOTE — TELEPHONE ENCOUNTER
Please call [facility] lab - they did not draw the PSA. Can it be added on? [facility] on [address], [address] [facility]. Thanks.

## 2023-11-18 LAB
ABSOLUTE BASOPHILS: 39 CELLS/UL (ref 0–200)
ABSOLUTE EOSINOPHILS: 416 CELLS/UL (ref 15–500)
ABSOLUTE LYMPHOCYTES: 1948 CELLS/UL (ref 850–3900)
ABSOLUTE MONOCYTES: 539 CELLS/UL (ref 200–950)
ABSOLUTE NEUTROPHILS: 4759 CELLS/UL (ref 1500–7800)
ALBUMIN/GLOBULIN RATIO: 1.7 (CALC) (ref 1–2.5)
ALBUMIN: 3.8 G/DL (ref 3.6–5.1)
ALKALINE PHOSPHATASE: 78 U/L (ref 35–144)
ALT: 29 U/L (ref 9–46)
AST: 28 U/L (ref 10–35)
BASOPHILS: 0.5 %
BILIRUBIN, TOTAL: 0.2 MG/DL (ref 0.2–1.2)
BUN: 19 MG/DL (ref 7–25)
CALCIUM: 8.4 MG/DL (ref 8.6–10.3)
CARBON DIOXIDE: 27 MMOL/L (ref 20–32)
CHLORIDE: 105 MMOL/L (ref 98–110)
CHOL/HDLC RATIO: 3.3 (CALC)
CHOLESTEROL, TOTAL: 118 MG/DL
CREATININE, RANDOM URINE: 90 MG/DL (ref 20–320)
CREATININE: 0.73 MG/DL (ref 0.7–1.35)
EGFR: 102 ML/MIN/1.73M2
EOSINOPHILS: 5.4 %
GLOBULIN: 2.3 G/DL (CALC) (ref 1.9–3.7)
GLUCOSE: 114 MG/DL (ref 65–99)
HDL CHOLESTEROL: 36 MG/DL
HEMATOCRIT: 31.8 % (ref 38.5–50)
HEMOGLOBIN: 11.1 G/DL (ref 13.2–17.1)
LDL-CHOLESTEROL: 62 MG/DL (CALC)
LYMPHOCYTES: 25.3 %
MCH: 30.5 PG (ref 27–33)
MCHC: 34.9 G/DL (ref 32–36)
MCV: 87.4 FL (ref 80–100)
MICROALBUMIN/CREATININE RATIO, RANDOM URINE: 1108 MCG/MG CREAT
MICROALBUMIN: 99.7 MG/DL
MONOCYTES: 7 %
MPV: 9.4 FL (ref 7.5–12.5)
NEUTROPHILS: 61.8 %
NON-HDL CHOLESTEROL: 82 MG/DL (CALC)
PLATELET COUNT: 301 THOUSAND/UL (ref 140–400)
POTASSIUM: 5.1 MMOL/L (ref 3.5–5.3)
PROTEIN, TOTAL: 6.1 G/DL (ref 6.1–8.1)
PSA, TOTAL: 0.9 NG/ML
RDW: 12.7 % (ref 11–15)
RED BLOOD CELL COUNT: 3.64 MILLION/UL (ref 4.2–5.8)
SODIUM: 137 MMOL/L (ref 135–146)
TRIGLYCERIDES: 118 MG/DL
TSH: 1.06 MIU/L (ref 0.4–4.5)
WHITE BLOOD CELL COUNT: 7.7 THOUSAND/UL (ref 3.8–10.8)

## 2023-11-20 ENCOUNTER — TELEPHONE (OUTPATIENT)
Dept: ENDOCRINOLOGY CLINIC | Facility: CLINIC | Age: 63
End: 2023-11-20

## 2023-11-20 NOTE — TELEPHONE ENCOUNTER
Received lab results from [address] [place] [address] collected on 11/13/23. Placed in providers folder for review.

## 2024-01-31 ENCOUNTER — TELEPHONE (OUTPATIENT)
Dept: ENDOCRINOLOGY CLINIC | Facility: CLINIC | Age: 64
End: 2024-01-31

## 2024-01-31 NOTE — TELEPHONE ENCOUNTER
LOV: 11/17/23    Per LOV note:       \"Change Novolog to 10 units subcutaneous in AM and 20 units subcutaneous before dinner \"    Future Appointments   Date Time Provider Department Center   5/24/2024  7:30 AM Rena Almonte MD [facility]     Pended RX for losartan and novolog

## 2024-02-01 RX ORDER — INSULIN ASPART 100 [IU]/ML
INJECTION, SOLUTION INTRAVENOUS; SUBCUTANEOUS
Qty: 30 ML | Refills: 1 | Status: SHIPPED | OUTPATIENT
Start: 2024-02-01

## 2024-02-01 RX ORDER — LOSARTAN POTASSIUM 100 MG/1
100 TABLET ORAL DAILY
Qty: 90 TABLET | Refills: 1 | Status: SHIPPED | OUTPATIENT
Start: 2024-02-01

## 2024-05-17 ENCOUNTER — TELEPHONE (OUTPATIENT)
Dept: ENDOCRINOLOGY CLINIC | Facility: CLINIC | Age: 64
End: 2024-05-17

## 2024-05-17 NOTE — TELEPHONE ENCOUNTER
Patient states that he Needs lab orders to be entered for [place] as patient will not be going to [facility].

## 2024-05-18 ENCOUNTER — LAB ENCOUNTER (OUTPATIENT)
Dept: LAB | Facility: HOSPITAL | Age: 64
End: 2024-05-18
Attending: INTERNAL MEDICINE

## 2024-05-18 LAB
BASOPHILS # BLD AUTO: 0.03 X10(3) UL (ref 0–0.2)
BASOPHILS NFR BLD AUTO: 0.4 %
CREAT UR-SCNC: 66.3 MG/DL
DEPRECATED RDW RBC AUTO: 43.5 FL (ref 35.1–46.3)
EOSINOPHIL # BLD AUTO: 0.31 X10(3) UL (ref 0–0.7)
EOSINOPHIL NFR BLD AUTO: 3.8 %
ERYTHROCYTE [DISTWIDTH] IN BLOOD BY AUTOMATED COUNT: 13.2 % (ref 11–15)
HCT VFR BLD AUTO: 33.8 %
HGB BLD-MCNC: 11.6 G/DL
IMM GRANULOCYTES # BLD AUTO: 0.05 X10(3) UL (ref 0–1)
IMM GRANULOCYTES NFR BLD: 0.6 %
LYMPHOCYTES # BLD AUTO: 2.05 X10(3) UL (ref 1–4)
LYMPHOCYTES NFR BLD AUTO: 24.8 %
MCH RBC QN AUTO: 30.9 PG (ref 26–34)
MCHC RBC AUTO-ENTMCNC: 34.3 G/DL (ref 31–37)
MCV RBC AUTO: 90.1 FL
MICROALBUMIN UR-MCNC: 33.9 MG/DL
MICROALBUMIN/CREAT 24H UR-RTO: 511.3 UG/MG (ref ?–30)
MONOCYTES # BLD AUTO: 0.6 X10(3) UL (ref 0.1–1)
MONOCYTES NFR BLD AUTO: 7.3 %
NEUTROPHILS # BLD AUTO: 5.22 X10 (3) UL (ref 1.5–7.7)
NEUTROPHILS # BLD AUTO: 5.22 X10(3) UL (ref 1.5–7.7)
NEUTROPHILS NFR BLD AUTO: 63.1 %
PLATELET # BLD AUTO: 298 10(3)UL (ref 150–450)
RBC # BLD AUTO: 3.75 X10(6)UL
WBC # BLD AUTO: 8.3 X10(3) UL (ref 4–11)

## 2024-05-18 PROCEDURE — 82043 UR ALBUMIN QUANTITATIVE: CPT | Performed by: INTERNAL MEDICINE

## 2024-05-18 PROCEDURE — 82570 ASSAY OF URINE CREATININE: CPT | Performed by: INTERNAL MEDICINE

## 2024-05-18 PROCEDURE — 36415 COLL VENOUS BLD VENIPUNCTURE: CPT | Performed by: INTERNAL MEDICINE

## 2024-05-18 PROCEDURE — 85025 COMPLETE CBC W/AUTO DIFF WBC: CPT | Performed by: INTERNAL MEDICINE

## 2024-05-24 ENCOUNTER — OFFICE VISIT (OUTPATIENT)
Dept: ENDOCRINOLOGY CLINIC | Facility: CLINIC | Age: 64
End: 2024-05-24

## 2024-05-24 VITALS
DIASTOLIC BLOOD PRESSURE: 58 MMHG | SYSTOLIC BLOOD PRESSURE: 101 MMHG | HEIGHT: 70 IN | WEIGHT: 187.19 LBS | HEART RATE: 68 BPM | BODY MASS INDEX: 26.8 KG/M2

## 2024-05-24 DIAGNOSIS — Z12.5 PROSTATE CANCER SCREENING: ICD-10-CM

## 2024-05-24 DIAGNOSIS — D64.9 ANEMIA, UNSPECIFIED TYPE: ICD-10-CM

## 2024-05-24 DIAGNOSIS — E11.65 UNCONTROLLED TYPE 2 DIABETES MELLITUS WITH HYPERGLYCEMIA (HCC): Primary | ICD-10-CM

## 2024-05-24 LAB
CARTRIDGE EXPIRATION DATE: ABNORMAL DATE
GLUCOSE BLOOD: 76
HEMOGLOBIN A1C: 11.3 % (ref 4.3–5.6)
TEST STRIP LOT #: NORMAL NUMERIC

## 2024-05-24 PROCEDURE — 82947 ASSAY GLUCOSE BLOOD QUANT: CPT | Performed by: INTERNAL MEDICINE

## 2024-05-24 PROCEDURE — 99214 OFFICE O/P EST MOD 30 MIN: CPT | Performed by: INTERNAL MEDICINE

## 2024-05-24 PROCEDURE — 83036 HEMOGLOBIN GLYCOSYLATED A1C: CPT | Performed by: INTERNAL MEDICINE

## 2024-05-24 NOTE — PROGRESS NOTES
Name: Quinten Ghosh  Date: 5/24/2024    Referring Physician: No ref. provider found    HISTORY OF PRESENT ILLNESS   Quinten Ghosh is a 64 year old male who presents for diabetes mellitus diagnosed in 1997.  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       Prior HbA, C or glycohemoglobin were 12.4% 6/2019; 9.4% 10/2019; 11.0% 2/2020; 10.1% 8/2020; 9.9% 1/2021; 10.1% 7/2021; 10.5% 4/2022; 10.4% 10/2022; 9.1% 4/2023; 11.7% 11/2023; 11.3% POC Today   Dietary compliance: Poor  Exercise: No  Polyuria/polydipsia: No  Blurred vision: No    Episodes of hypoglycemia: Yes, in AM rarely 50s   Blood Glucose:  Checking once daily   Bedtime:     Medications for DM   Novolog 10 units subcutaneous QAM; 25 units subcutaneous dinner   NPH 60 units subcutaneous Bedtime   Metformin 500mg in AM     Basaglar and Toujeo stopped due to cost   He is not taking Creon.     REVIEW OF SYSTEMS  Eyes: Diabetic retinopathy present: No            Most recent visit to eye doctor in last 12 months: No - last visit 2 years     CV: Cardiovascular disease present: No         Hypertension present: No         Hyperlipidemia present: Yes, Gemfibrozil only taking in AM          Peripheral Vascular Disease present: No    : Nephropathy present: No    Neuro: Neuropathy present: No    Skin: Infection or ulceration: No    Osteoporosis: No    Thyroid disease: No    #2 Anemia     He is taking Iron supplementation daily.  Discussed CLN but he declined.       Medications:     Current Outpatient Medications:     NOVOLOG FLEXPEN 100 UNIT/ML Subcutaneous Solution Pen-injector, Inject 10 units before breakfast, 20 units before dinner, Disp: 30 mL, Rfl: 1    metFORMIN  MG Oral Tablet 24 Hr, Take 1 tablet (500 mg total) by mouth 2 (two) times daily with meals., Disp: 180 tablet, Rfl: 1    Insulin NPH Human, Isophane, (RELION N SC), Inject 20 Units into the skin nightly., Disp: , Rfl:     losartan 100 MG Oral Tab, Take 1  tablet (100 mg total) by mouth daily., Disp: 90 tablet, Rfl: 1    gemfibrozil 600 MG Oral Tab, Take 1 tablet (600 mg total) by mouth in the morning and 1 tablet (600 mg total) before bedtime., Disp: 180 tablet, Rfl: 1    Insulin Glargine, 2 Unit Dial, (TOUJEO MAX SOLOSTAR) 300 UNIT/ML Subcutaneous Solution Pen-injector, Inject 60 Units into the skin daily. (Patient not taking: Reported on 5/24/2024), Disp: 9 mL, Rfl: 0    Minocycline HCl 100 MG Oral Cap, Take 1 capsule (100 mg total) by mouth 2 (two) times daily., Disp: , Rfl:     Insulin Pen Needle (BD PEN NEEDLE MARTIN U/F) 32G X 4 MM Does not apply Misc, Inject 4 times per day, Disp: 200 each, Rfl: 3     Allergies:   Allergies   Allergen Reactions    Radiology Contrast Iodinated Dyes HIVES    Sulfur-Salicylic Acid [Salicylic Acid-Sulfur] HIVES       Social History:   Social History     Socioeconomic History    Marital status:    Tobacco Use    Smoking status: Every Day       Medical History:   Past Medical History:    Diabetes (HCC)       Surgical history:   No past surgical history on file.      PHYSICAL EXAM   /58   Pulse 68   Ht 5' 10\" (1.778 m)   Wt 187 lb 3.2 oz (84.9 kg)   BMI 26.86 kg/m²     General Appearance:  alert, well developed, in no acute distress  Eyes:  normal conjunctivae, sclera., normal sclera and normal pupils  Ears/Nose/Mouth/Throat/Neck:  no palpable thyroid nodules or cervical lymphadenopathy  Back: no kyphosis or back tenderness  Musculoskeletal:  normal muscle strength and tone  Skin:  normal moisture and skin texture  Hair & Nails:  normal scalp hair     Hematologic:  no excessive bruising  Neuro:  sensory grossly intact and motor grossly intact  Psychiatric:  oriented to time, self, and place  Nutritional:  no abnormal weight gain or loss    ASSESSMENT/PLAN:      1. Diabetes Mellitus Type 1, Uncontrolled  -Uncontrolled, HgA1c 11.7% -->significant increase    -He is consistent with insulin but no frequent SBGM   -He did  not like CGM   -He has transitioned back to NPH insulin due to cost   -Discussed importance of glycemic control to prevent complications of diabetes  -Discussed complications of diabetes include retinopathy, neuropathy, nephropathy and cardiovascular disease  -Discussed importance of SBGM  -Decrease NPH to 50 units subcutaneous at bedtime (try to prevent low in AM to dose Novolog)   -Continue Novolog but discussed timing of insulin before meals and NOT after   -Discussed adding back breakfast dose which he has been skipping  -He is taking large dose of Novolog at bedtime causing AM hypoglycemia, discussed at length importance of taking before dinner  -Change Novolog 15 units subcutaneous QAM; 25 units subcutaneous before dinner  -Verbalized understanding of risks and benefits  -He didn't like CGM   -Normotensive, continue losartan   -Normal lipids   -Normal foot exam performed 11/2023   -Recheck labs before next visit   -Discussed Colonoscopy but he declined   -Discussed new anemia, improved - he declined Colonoscopy  -Discussed again fecal occult blood testing and agrees to this option     2. Hypertriglyceridemia  -Continue Gemfibrozil   -Lipids controlled     3. Hypertension  -Continue Losartan 100mg PO daily  -Discussed importance of taking medication for renal protection     RTC 6 months (due to trouble affording visits)    5/24/2024  Rena Almonte MD

## 2024-05-24 NOTE — PATIENT INSTRUCTIONS
DECREASE NPH to 50 units subcutaneous at bedtime    INCREASE Novolog 15 units subcutaneous QAM; 25 units subcutaneous Before dinner

## 2024-06-25 RX ORDER — LOSARTAN POTASSIUM 100 MG/1
100 TABLET ORAL DAILY
Qty: 90 TABLET | Refills: 1 | Status: SHIPPED | OUTPATIENT
Start: 2024-06-25

## 2024-06-25 NOTE — TELEPHONE ENCOUNTER
Endocrine Refill protocol for oral antihypertensive medications    Protocol Criteria:pass    -Appointment with Endocrinology completed in the last 6 months or scheduled in the next 3 months    -BMP or CMP has been completed in the last 12 months     -GFR is greater than or equal to 50    Verify the above has been completed or scheduled in the appropriate timeline. If so can send a 90 day supply with 1 refill.   Verify BMP or CMP has been completed in last year  Verify last GFR result     Last completed office visit:5/24/2024 Rena Almonte MD   Next scheduled Follow up: 11/22/24     Last BMP or CMP completion date:11/18/23

## 2024-06-28 RX ORDER — METFORMIN HYDROCHLORIDE 500 MG/1
500 TABLET, EXTENDED RELEASE ORAL 2 TIMES DAILY WITH MEALS
Qty: 180 TABLET | Refills: 1 | Status: SHIPPED | OUTPATIENT
Start: 2024-06-28

## 2024-06-28 NOTE — TELEPHONE ENCOUNTER
Endocrine Refill protocol for oral and injectable diabetic medications    Protocol Criteria: passed    -Appointment with Endocrinology completed in the last 6 months or scheduled in the next 3 months    -A1c result <8.5% in the past 6 months      Verify the above has been completed or scheduled in the appropriate timeline. If so can send a 90 day supply with 1 refill.     Last completed office visit: 5/24/2024 Rena Almonte MD   Next scheduled Follow up:   Future Appointments   Date Time Provider Department Center   11/22/2024  8:00 AM Rena Almonte MD [facility]      Last A1c result: Last A1c value was 11.3% done 5/24/2024.

## 2024-07-15 ENCOUNTER — TELEPHONE (OUTPATIENT)
Dept: ENDOCRINOLOGY CLINIC | Facility: CLINIC | Age: 64
End: 2024-07-15

## 2024-07-15 NOTE — TELEPHONE ENCOUNTER
Insurance calling with a discrepancy with Metforming 500 mg tablets that were prescribed.  According to her records patient is suppose to be taking 2 tablets a day, but patient informed her that he is taking 1 tablet a day.  She states that she does not need a call back, she just wanted to make MD aware.

## 2024-07-17 NOTE — TELEPHONE ENCOUNTER
Noted per 5/24/24 office visit note. Patient is to take Metformin 500mg in AM. Patient is correct on amount he is to take.

## 2024-09-23 RX ORDER — GEMFIBROZIL 600 MG/1
600 TABLET, FILM COATED ORAL 2 TIMES DAILY
Qty: 180 TABLET | Refills: 0 | Status: SHIPPED | OUTPATIENT
Start: 2024-09-23

## 2024-09-23 NOTE — TELEPHONE ENCOUNTER
Endocrine refill protocol for lipid lowering medications    Protocol Criteria:  FAILED Reason: No Visit in required time frame  Appointment with Endocrinology completed in the last 6 months or scheduled in the next 3 months     Verify appointment has been completed or scheduled in the appropriate timeline. If so can send a 90 day supply with 1 refill.   Lipid panel must have been completed in the last 12 months   ALT result below 80  LDL result below 130    Last completed office visit:5/24/2024 Rena Almonte MD   Next scheduled Follow up:   Future Appointments   Date Time Provider Department Center   1/10/2025  8:45 AM Rena Almonte MD [facility]      Last Lipid panel date: Cholesterol: 118, done on 11/13/2023.  HDL Cholesterol: 36, done on 11/13/2023.  TriGlycerides 118, done on 11/13/2023.  LDL Cholesterol: 62, done on 11/13/2023.     Last ALT result: Last ALT was 29 done on 11/13/2023.  Last AST was 28 done on 11/13/2023.

## 2024-11-01 DIAGNOSIS — E11.65 UNCONTROLLED TYPE 2 DIABETES MELLITUS WITH HYPERGLYCEMIA (HCC): ICD-10-CM

## 2024-11-01 RX ORDER — INSULIN ASPART 100 [IU]/ML
25 INJECTION, SOLUTION INTRAVENOUS; SUBCUTANEOUS
Qty: 45 ML | Refills: 1 | Status: SHIPPED | OUTPATIENT
Start: 2024-11-01

## 2024-11-01 NOTE — TELEPHONE ENCOUNTER
Endocrine refill protocol for rapid acting, regular, intermediate, and mixed insulin:    Protocol Criteria:  FAILED Reason: Elevated A1C    If all below requirements are met, send a 90-day supply with 1 refill per provider protocol.    Verify appointment with Endocrinology completed in the last 6 months or scheduled in the next 3 months.  Verify A1C has been completed within the last 6 months and is below 8.5%    -May substitute prescriptions for Novolog and Humalog unless documented allergy (pens and vials) at the same dose and concentration per insurance preference and provider protocol.   -May substitute prescriptions for Novolin R and Humulin R unless documented allergy (pens and vials) at the same dose and concentration per insurance preference and provider protocol.   -May substitute prescriptions for Novolin N and Humulin N unless documented allergy (pens and vials) at the same dose and concentration per insurance preference and provider protocol.   -May substitute prescriptions for Humulin and Novolin 70/30 insulin unless documented allergy at the same dose and concentration per insurance preference and provider protocol.    Last completed office visit: 5/24/2024 Rena Almonte MD   Next scheduled Follow up:   Future Appointments   Date Time Provider Department Center   1/10/2025  8:45 AM Rena Almonte MD [facility]      Last A1C result: 11.3% done 5/24/2024.

## 2024-11-13 ENCOUNTER — TELEPHONE (OUTPATIENT)
Dept: ENDOCRINOLOGY CLINIC | Facility: CLINIC | Age: 64
End: 2024-11-13

## 2024-11-13 NOTE — TELEPHONE ENCOUNTER
Pharmacy calling states due to patient being diabetic asking if  would like put patient on a statin. Please call.

## 2025-01-06 ENCOUNTER — LAB ENCOUNTER (OUTPATIENT)
Dept: LAB | Facility: HOSPITAL | Age: 65
End: 2025-01-06
Attending: INTERNAL MEDICINE
Payer: MEDICARE

## 2025-01-06 DIAGNOSIS — Z12.5 PROSTATE CANCER SCREENING: ICD-10-CM

## 2025-01-06 DIAGNOSIS — D64.9 ANEMIA, UNSPECIFIED TYPE: ICD-10-CM

## 2025-01-06 DIAGNOSIS — E11.65 UNCONTROLLED TYPE 2 DIABETES MELLITUS WITH HYPERGLYCEMIA (HCC): ICD-10-CM

## 2025-01-06 LAB
ALBUMIN SERPL-MCNC: 4.3 G/DL (ref 3.2–4.8)
ALBUMIN/GLOB SERPL: 1.9 {RATIO} (ref 1–2)
ALP LIVER SERPL-CCNC: 97 U/L
ALT SERPL-CCNC: 27 U/L
ANION GAP SERPL CALC-SCNC: 6 MMOL/L (ref 0–18)
AST SERPL-CCNC: 26 U/L (ref ?–34)
BASOPHILS # BLD AUTO: 0.06 X10(3) UL (ref 0–0.2)
BASOPHILS NFR BLD AUTO: 0.7 %
BILIRUB SERPL-MCNC: 0.2 MG/DL (ref 0.2–1.1)
BUN BLD-MCNC: 20 MG/DL (ref 9–23)
BUN/CREAT SERPL: 19.6 (ref 10–20)
CALCIUM BLD-MCNC: 8.9 MG/DL (ref 8.7–10.4)
CHLORIDE SERPL-SCNC: 110 MMOL/L (ref 98–112)
CHOLEST SERPL-MCNC: 104 MG/DL (ref ?–200)
CO2 SERPL-SCNC: 26 MMOL/L (ref 21–32)
COMPLEXED PSA SERPL-MCNC: 1.07 NG/ML (ref ?–4)
CREAT BLD-MCNC: 1.02 MG/DL
CREAT UR-SCNC: 119.4 MG/DL
DEPRECATED RDW RBC AUTO: 42.6 FL (ref 35.1–46.3)
EGFRCR SERPLBLD CKD-EPI 2021: 82 ML/MIN/1.73M2 (ref 60–?)
EOSINOPHIL # BLD AUTO: 0.46 X10(3) UL (ref 0–0.7)
EOSINOPHIL NFR BLD AUTO: 5.3 %
ERYTHROCYTE [DISTWIDTH] IN BLOOD BY AUTOMATED COUNT: 13.1 % (ref 11–15)
FASTING PATIENT LIPID ANSWER: YES
FASTING STATUS PATIENT QL REPORTED: YES
GLOBULIN PLAS-MCNC: 2.3 G/DL (ref 2–3.5)
GLUCOSE BLD-MCNC: 69 MG/DL (ref 70–99)
HCT VFR BLD AUTO: 31.5 %
HDLC SERPL-MCNC: 22 MG/DL (ref 40–59)
HGB BLD-MCNC: 11.3 G/DL
IMM GRANULOCYTES # BLD AUTO: 0.05 X10(3) UL (ref 0–1)
IMM GRANULOCYTES NFR BLD: 0.6 %
LDLC SERPL CALC-MCNC: 62 MG/DL (ref ?–100)
LYMPHOCYTES # BLD AUTO: 2.2 X10(3) UL (ref 1–4)
LYMPHOCYTES NFR BLD AUTO: 25.4 %
MCH RBC QN AUTO: 32.5 PG (ref 26–34)
MCHC RBC AUTO-ENTMCNC: 35.9 G/DL (ref 31–37)
MCV RBC AUTO: 90.5 FL
MICROALBUMIN UR-MCNC: 115 MG/DL
MICROALBUMIN/CREAT 24H UR-RTO: 963.1 UG/MG (ref ?–30)
MONOCYTES # BLD AUTO: 0.63 X10(3) UL (ref 0.1–1)
MONOCYTES NFR BLD AUTO: 7.3 %
NEUTROPHILS # BLD AUTO: 5.25 X10 (3) UL (ref 1.5–7.7)
NEUTROPHILS # BLD AUTO: 5.25 X10(3) UL (ref 1.5–7.7)
NEUTROPHILS NFR BLD AUTO: 60.7 %
NONHDLC SERPL-MCNC: 82 MG/DL (ref ?–130)
OSMOLALITY SERPL CALC.SUM OF ELEC: 295 MOSM/KG (ref 275–295)
PLATELET # BLD AUTO: 265 10(3)UL (ref 150–450)
POTASSIUM SERPL-SCNC: 4.9 MMOL/L (ref 3.5–5.1)
PROT SERPL-MCNC: 6.6 G/DL (ref 5.7–8.2)
RBC # BLD AUTO: 3.48 X10(6)UL
SODIUM SERPL-SCNC: 142 MMOL/L (ref 136–145)
TRIGL SERPL-MCNC: 108 MG/DL (ref 30–149)
TSI SER-ACNC: 1.37 UIU/ML (ref 0.55–4.78)
VLDLC SERPL CALC-MCNC: 16 MG/DL (ref 0–30)
WBC # BLD AUTO: 8.7 X10(3) UL (ref 4–11)

## 2025-01-06 PROCEDURE — 80053 COMPREHEN METABOLIC PANEL: CPT

## 2025-01-06 PROCEDURE — 85025 COMPLETE CBC W/AUTO DIFF WBC: CPT

## 2025-01-06 PROCEDURE — 36415 COLL VENOUS BLD VENIPUNCTURE: CPT

## 2025-01-06 PROCEDURE — 82043 UR ALBUMIN QUANTITATIVE: CPT

## 2025-01-06 PROCEDURE — 80061 LIPID PANEL: CPT

## 2025-01-06 PROCEDURE — 82570 ASSAY OF URINE CREATININE: CPT

## 2025-01-06 PROCEDURE — 84443 ASSAY THYROID STIM HORMONE: CPT

## 2025-01-10 ENCOUNTER — OFFICE VISIT (OUTPATIENT)
Dept: ENDOCRINOLOGY CLINIC | Facility: CLINIC | Age: 65
End: 2025-01-10

## 2025-01-10 VITALS
WEIGHT: 194 LBS | SYSTOLIC BLOOD PRESSURE: 174 MMHG | DIASTOLIC BLOOD PRESSURE: 78 MMHG | BODY MASS INDEX: 28 KG/M2 | HEART RATE: 80 BPM

## 2025-01-10 DIAGNOSIS — E11.65 UNCONTROLLED TYPE 2 DIABETES MELLITUS WITH HYPERGLYCEMIA (HCC): Primary | ICD-10-CM

## 2025-01-10 DIAGNOSIS — E78.5 HYPERLIPIDEMIA, UNSPECIFIED HYPERLIPIDEMIA TYPE: ICD-10-CM

## 2025-01-10 DIAGNOSIS — I10 HYPERTENSION, UNSPECIFIED TYPE: ICD-10-CM

## 2025-01-10 LAB
GLUCOSE BLOOD: 162
HEMOGLOBIN A1C: 10.7 % (ref 4.3–5.6)
TEST STRIP LOT #: NORMAL NUMERIC

## 2025-01-10 PROCEDURE — 83036 HEMOGLOBIN GLYCOSYLATED A1C: CPT | Performed by: INTERNAL MEDICINE

## 2025-01-10 PROCEDURE — 82947 ASSAY GLUCOSE BLOOD QUANT: CPT | Performed by: INTERNAL MEDICINE

## 2025-01-10 PROCEDURE — 99214 OFFICE O/P EST MOD 30 MIN: CPT | Performed by: INTERNAL MEDICINE

## 2025-01-10 NOTE — PROGRESS NOTES
Name: Quinten Ghosh  Date: 1/10/2025    Referring Physician: No ref. provider found    HISTORY OF PRESENT ILLNESS   Quinten Ghosh is a 64 year old male who presents for diabetes mellitus diagnosed in 1997.  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       Of note he fell twice on the ice this morning on the way to clinic.  He was having significant pain in his shoulder and hit his head once after a fall.  He denied any dizziness or lightheadedness.      Prior HbA, C or glycohemoglobin were 12.4% 6/2019; 9.4% 10/2019; 11.0% 2/2020; 10.1% 8/2020; 9.9% 1/2021; 10.1% 7/2021; 10.5% 4/2022; 10.4% 10/2022; 9.1% 4/2023; 11.7% 11/2023; 11.3% 5/2024; 10.7% POC Today      Dietary compliance: Poor  Exercise: No  Polyuria/polydipsia: No  Blurred vision: No    Episodes of hypoglycemia: Yes, in AM rarely 50s   Blood Glucose:  Checking once daily   Bedtime: 212, 79, avg 110-130; significant variability     Medications for DM   Novolog 20 units subcutaneous QAM; 30 units subcutaneous dinner   NPH 50 units subcutaneous Bedtime   Metformin 500mg in AM     Basaglar and Toujeo stopped due to cost   He is not taking Creon.     REVIEW OF SYSTEMS  Eyes: Diabetic retinopathy present: No            Most recent visit to eye doctor in last 12 months: No - last visit 2 years     CV: Cardiovascular disease present: No         Hypertension present: No         Hyperlipidemia present: Yes, Gemfibrozil only taking in AM          Peripheral Vascular Disease present: No    : Nephropathy present: No    Neuro: Neuropathy present: No    Skin: Infection or ulceration: No    Osteoporosis: No    Thyroid disease: No    #2 Anemia     He is taking Iron supplementation daily.  Discussed CLN but he declined.       Medications:     Current Outpatient Medications:     GEMFIBROZIL 600 MG Oral Tab, TAKE 1 TABLET BY MOUTH IN THE MORNING AND 1 AT BEDTIME, Disp: 180 tablet, Rfl: 0    METFORMIN  MG Oral Tablet 24 Hr, TAKE 1  TABLET BY MOUTH TWICE DAILY WITH MEALS, Disp: 180 tablet, Rfl: 1    LOSARTAN 100 MG Oral Tab, Take 1 tablet by mouth once daily, Disp: 90 tablet, Rfl: 1    NOVOLOG FLEXPEN 100 UNIT/ML Subcutaneous Solution Pen-injector, Inject 10 units before breakfast, 20 units before dinner, Disp: 30 mL, Rfl: 1    Insulin NPH Human, Isophane, (RELION N SC), Inject 20 Units into the skin nightly., Disp: , Rfl:     Minocycline HCl 100 MG Oral Cap, Take 1 capsule (100 mg total) by mouth 2 (two) times daily., Disp: , Rfl:     Insulin Pen Needle (BD PEN NEEDLE MARTIN U/F) 32G X 4 MM Does not apply Misc, Inject 4 times per day, Disp: 200 each, Rfl: 3    NOVOLOG FLEXPEN 100 UNIT/ML Subcutaneous Solution Pen-injector, INJECT 25 UNITS SUBCUTANEOUSLY THREE TIMES DAILY BEFORE MEAL(S) (Patient not taking: Reported on 1/10/2025), Disp: 45 mL, Rfl: 1    Insulin Glargine, 2 Unit Dial, (TOUJEO MAX SOLOSTAR) 300 UNIT/ML Subcutaneous Solution Pen-injector, Inject 60 Units into the skin daily. (Patient not taking: Reported on 1/10/2025), Disp: 9 mL, Rfl: 0     Allergies:   Allergies   Allergen Reactions    Radiology Contrast Iodinated Dyes HIVES    Sulfur-Salicylic Acid [Salicylic Acid-Sulfur] HIVES       Social History:   Social History     Socioeconomic History    Marital status:    Tobacco Use    Smoking status: Every Day       Medical History:   Past Medical History:    Diabetes (HCC)       Surgical history:   No past surgical history on file.      PHYSICAL EXAM   BP (!) 174/78   Pulse 80   Wt 194 lb (88 kg)   BMI 27.84 kg/m²     General Appearance:  alert, well developed, in no acute distress  Eyes:  normal conjunctivae, sclera., normal sclera and normal pupils  Ears/Nose/Mouth/Throat/Neck:  no palpable thyroid nodules or cervical lymphadenopathy  Back: no kyphosis or back tenderness  Musculoskeletal:  normal muscle strength and tone  Skin:  normal moisture and skin texture  Hair & Nails:  normal scalp hair     Hematologic:  no  excessive bruising  Neuro:  sensory grossly intact and motor grossly intact  Psychiatric:  oriented to time, self, and place  Nutritional:  no abnormal weight gain or loss  Bilateral barefoot skin diabetic exam is normal, visualized feet and the appearance is normal.  Bilateral monofilament/sensation of both feet is normal.  Pulsation pedal pulse exam of both lower legs/feet is normal as well.        ASSESSMENT/PLAN:      1. Diabetes Mellitus Type 1, Uncontrolled  -Uncontrolled, HgA1c 10.7% -->improved   -He is consistent with insulin but no frequent SBGM   -He did not like CGM   -He has transitioned back to NPH insulin due to cost   -Discussed importance of glycemic control to prevent complications of diabetes  -Discussed complications of diabetes include retinopathy, neuropathy, nephropathy and cardiovascular disease  -Discussed importance of SBGM  -Change NPH 10 units subcutaneous QAM; 50 units subcutaneous at bedtime  -Continue Novolog but discussed timing of insulin before meals and NOT after   -He is taking large dose of Novolog at bedtime causing AM hypoglycemia, discussed at length importance of taking before dinner  -Continue Novolog 15-20 units subcutaneous QAM; 25 units subcutaneous before dinner  -Verbalized understanding of risks and benefits  -He didn't like CGM   -BP elevated today due to falls and snow, previously normal on current regimen   -Normal lipids   -Normal foot exam performed today   -labs stable   -Discussed Colonoscopy again but he declined   -Discussed new anemia - he declined Colonoscopy  -Discussed again fecal occult blood testing and agrees to this option, reprinted order   -Discussed given anemia high risk for colon CA but he declined intervention     2. Hypertriglyceridemia  -Continue Gemfibrozil   -Lipids controlled     3. Hypertension  -Continue Losartan 100mg PO daily  -Discussed importance of taking medication for renal protection \    4. Falls  - Discussed CT scan head but he  declined   - Discussed shoulder imaging multiple times but he declined     RTC 6 months (due to trouble affording visits)    1/10/2025  Rena Almonte MD

## 2025-01-10 NOTE — PATIENT INSTRUCTIONS
CHANGE NPH to 50 units subcutaneous Bedtime and ADD 10 units subcutaneous in AM    CONTINUE Novolog 20 units subcutaneous breakfast 30 units subcutaneous dinner

## 2025-01-14 ENCOUNTER — TELEPHONE (OUTPATIENT)
Dept: ENDOCRINOLOGY CLINIC | Facility: CLINIC | Age: 65
End: 2025-01-14

## 2025-01-14 NOTE — TELEPHONE ENCOUNTER
Dr. Almonte,  Spoke to patient  - he stated BP today was \"175/80 something\" - was about the same yesterday - both days he took losartan 100mg after checking BP    Patient stated while lying in bed yesterday morning his shoulder \"popped back\" into place and shoulder has been feeling normal since    Patient stated he does not have PCP   Patient stated understanding that amlodipine may be added to meds but would like to try taking losartan when he wakes up tomorrow and then check BP after one hour to see if better, especially since now his shoulder is better    Patient stated he will wake up tomorrow and take losartan 100mg and then check BP after one hour and call clinic with reading  Thanks

## 2025-01-14 NOTE — TELEPHONE ENCOUNTER
Spoke to pt. Pt states he did take losartan this morning but did not check BP again after. RN asked pt to check right now but pt states he is not home right now so he's not able to. States he will provide update tomorrow.

## 2025-01-14 NOTE — TELEPHONE ENCOUNTER
Update noted. Has he checked BP reading during the day -- well after taking losartan?     If able, can he check BP now? If he recently took losartan, then check BP reading again a bit later today.     Ok to wait for BP update tomorrow, since likely his elevated BP readings have been pain related.     Thank you!

## 2025-01-15 NOTE — TELEPHONE ENCOUNTER
Dr. Almonte --    Pt's wife had sent us  reporting the patient's BP yesterday morning.     \"Quinten’s BP this am 172/82\"    Left voice message for pt to call back to get update for this AM.

## 2025-01-15 NOTE — TELEPHONE ENCOUNTER
Returned call to Mckenna. Left message to call back.   Attempted to call Quinten Watson. No answer, busy signal.

## 2025-01-15 NOTE — TELEPHONE ENCOUNTER
Ok, noted.  Lets ask him to take his losartan.  Keep monitoring BP for one week and let me know if not improved next week.  I suspect it is higher right now due to pain but contact clinic if not improved next week to adjust medication.

## 2025-01-17 NOTE — TELEPHONE ENCOUNTER
Attempted to return call to the patient's wife. Left message to call back.   Called Quinten directly. Left message to call back.

## 2025-01-20 RX ORDER — AMLODIPINE BESYLATE 5 MG/1
5 TABLET ORAL DAILY
Qty: 90 TABLET | Refills: 0 | Status: SHIPPED | OUTPATIENT
Start: 2025-01-20

## 2025-01-20 NOTE — TELEPHONE ENCOUNTER
Mima,     Please advise for Dr. Almonte's pt.     Patient states that BP has not gotten better, today was 174/74, the weekend was just as high. Patient reports compliance with Losartan 100 mg daily. Patient denies any severe pain. Reports shoulder pain is 2/20 and has been taking 2 ibuprofen to help with the pain. Please advise, thanks!

## 2025-01-20 NOTE — TELEPHONE ENCOUNTER
Called patient and communicated message below. Patient understands to begin taking amlodipine 5 mg in addition to losartan. RN advised to continue monitoring his BP every day in the morning and to call us with an update 1-2 weeks after starting medication

## 2025-01-20 NOTE — TELEPHONE ENCOUNTER
Update noted.     Please instruct patient to start amlodipine 5mg daily. Rx sent to the pharmacy.       He should give us an update on his new BP readings in 1-2 weeks again to make sure they normalize after taking additional BP medication.       Thank you!

## 2025-01-31 ENCOUNTER — TELEPHONE (OUTPATIENT)
Dept: ENDOCRINOLOGY CLINIC | Facility: CLINIC | Age: 65
End: 2025-01-31

## 2025-02-10 RX ORDER — LOSARTAN POTASSIUM 100 MG/1
100 TABLET ORAL DAILY
Qty: 90 TABLET | Refills: 1 | Status: SHIPPED | OUTPATIENT
Start: 2025-02-10

## 2025-02-10 RX ORDER — AMLODIPINE BESYLATE 10 MG/1
10 TABLET ORAL DAILY
Qty: 90 TABLET | Refills: 1 | Status: SHIPPED | OUTPATIENT
Start: 2025-02-10

## 2025-02-10 NOTE — TELEPHONE ENCOUNTER
Dr. Almonte,     Patient states that BG have been elevated, ranging about 150-160 over 70. Patient reports compliance with:   Amlodipine 5 mg  Losartan 100 mg     Patient has BP log at home but is not currently home.

## 2025-02-10 NOTE — TELEPHONE ENCOUNTER
Called the patient. Verified name and date of birth. Provided him with Dr. Almonte's recommendations below. Updated prescription sent as written for amlodipine. He states he will use up his current supply of the amlodipine 5 mg tablets by taking 2 daily.     Patient will continue losartan 100 mg. Requesting refill.     Endocrine Refill protocol for oral antihypertensive medications    Protocol Criteria:  PASSED       If all below requirements are met, send a 90-day supply with 1 refill per provider protocol.    Verify appointment with Endocrinology completed in the last 6 months or scheduled in the next 3 months.  Verify BMP or CMP completed in the last 12 months   Verify last GFR result is greater than or equal to 50     Last completed office visit:1/10/2025 Rena Almonte MD   Next scheduled Follow up:   Future Appointments   Date Time Provider Department Center   7/11/2025  7:30 AM Rena [facility]te [facility]      Last BMP or CMP completion date:  Lab Results   Component Value Date    GFRAA 118 04/08/2022    GFRNAA 102 04/08/2022    EGFRCR 82 01/06/2025

## 2025-02-10 NOTE — TELEPHONE ENCOUNTER
Patient is returning call.   Blood pressure was 155/73 this morning   He is down to 3 pills of the losartan.

## 2025-02-14 ENCOUNTER — TELEPHONE (OUTPATIENT)
Dept: ENDOCRINOLOGY CLINIC | Facility: CLINIC | Age: 65
End: 2025-02-14

## 2025-02-14 NOTE — TELEPHONE ENCOUNTER
Patient wants Dr Almonte to know that the increase of the Amlodipine has helped his blood pressure. Patient states that this morning it was 157/62 and pulse was 71. Yesterday reading was 158/65 and pulse was 74.

## 2025-02-19 NOTE — TELEPHONE ENCOUNTER
Reported BP readings improved    Per TE dated 1/14 - patient has having BP readings in 170s/70s       Patient currently taking the following for HTN:  - Amlodipine 10mg daily   - Losartan 100mg daily         Will defer further recommendations to Dr. Almonte, who is most familiar with patient's care.

## 2025-02-19 NOTE — TELEPHONE ENCOUNTER
Ok, noted.  BP is significantly improved.  Lets continue current meds for now and have patient provide update in 4-6 weeks. Thanks.

## 2025-02-20 NOTE — TELEPHONE ENCOUNTER
Spoke to pt. Provided recommendations written below by MD. He verbalized understanding, denies further questions or concerns.

## 2025-03-24 RX ORDER — METFORMIN HYDROCHLORIDE 500 MG/1
500 TABLET, EXTENDED RELEASE ORAL 2 TIMES DAILY WITH MEALS
Qty: 180 TABLET | Refills: 0 | Status: SHIPPED | OUTPATIENT
Start: 2025-03-24

## 2025-03-24 NOTE — TELEPHONE ENCOUNTER
Endocrine Refill protocol for metformin    Protocol Criteria:  FAILED  Reason: Elevated A1C    If all below requirements are met, send a 90-day supply with 1 refill per provider protocol.     Verify appointment with Endocrinology completed in the last 6 months or scheduled in the next 3 months.  Verify A1C has been completed within the last 6 months and is below 8.5%  Verify last GFR is greater than or equal to 40 in the past 12 months    Last completed office visit:1/10/2025 Rena Almonte MD   Next scheduled Follow up:   Future Appointments   Date Time Provider Department Center   7/11/2025  7:30 AM Rena Almonte MD [facility] [place] [facility]       Last GFR result:    Lab Results   Component Value Date    EGFRCR 82 01/06/2025     Last A1c result: Last A1C result: 10.7% done 1/10/2025.

## 2025-03-31 RX ORDER — GEMFIBROZIL 600 MG/1
600 TABLET, FILM COATED ORAL 2 TIMES DAILY
Qty: 180 TABLET | Refills: 0 | Status: SHIPPED | OUTPATIENT
Start: 2025-03-31

## 2025-03-31 NOTE — TELEPHONE ENCOUNTER
Endocrine refill protocol for lipid lowering medications    Protocol Criteria:  PASSED Reason: N/A    If all below requirements are met, send a 90-day supply with 1 refill per provider protocol.    Verify appointment with Endocrinology completed in the last 6 months or scheduled in the next 3 months.  Lipid panel must have been completed in the last 12 months   ALT result below 80  LDL result below 130    Last completed office visit:1/10/2025 Rena Almonte MD   Next scheduled Follow up:   Future Appointments   Date Time Provider Department Center   7/11/2025  7:30 AM Rena Almonte MD [facility]      Last Lipid panel date: Cholesterol: 104, done on 1/6/2025.  HDL Cholesterol: 22, done on 1/6/2025.  TriGlycerides 108, done on 1/6/2025.  LDL Cholesterol: 62, done on 1/6/2025.     Last ALT result: Last ALT was 27 done on 1/6/2025.  Last AST was 26 done on 1/6/2025.

## 2025-04-04 DIAGNOSIS — E11.65 UNCONTROLLED TYPE 2 DIABETES MELLITUS WITH HYPERGLYCEMIA (HCC): ICD-10-CM

## 2025-04-07 ENCOUNTER — TELEPHONE (OUTPATIENT)
Dept: ENDOCRINOLOGY CLINIC | Facility: CLINIC | Age: 65
End: 2025-04-07

## 2025-04-07 RX ORDER — INSULIN ASPART 100 [IU]/ML
25 INJECTION, SOLUTION INTRAVENOUS; SUBCUTANEOUS
Qty: 45 ML | Refills: 0 | Status: SHIPPED | OUTPATIENT
Start: 2025-04-07

## 2025-04-07 RX ORDER — PEN NEEDLE, DIABETIC 32GX 5/32"
NEEDLE, DISPOSABLE MISCELLANEOUS
Qty: 400 EACH | Refills: 1 | Status: SHIPPED | OUTPATIENT
Start: 2025-04-07

## 2025-04-07 NOTE — TELEPHONE ENCOUNTER
Patients  states that patient has fever and abdominal pain and would like to speak to RN.  Patient does not have PCP.  Please call.

## 2025-04-07 NOTE — TELEPHONE ENCOUNTER
Endocrine refill protocol for rapid acting, regular, intermediate, and mixed insulin:    Protocol Criteria:  FAILED Reason: Elevated A1C    If all below requirements are met, send a 90-day supply with 1 refill per provider protocol.    Verify appointment with Endocrinology completed in the last 6 months or scheduled in the next 3 months.  Verify A1C has been completed within the last 6 months and is below 8.5%    -May substitute prescriptions for Novolog and Humalog unless documented allergy (pens and vials) at the same dose and concentration per insurance preference and provider protocol.   -May substitute prescriptions for Novolin R and Humulin R unless documented allergy (pens and vials) at the same dose and concentration per insurance preference and provider protocol.   -May substitute prescriptions for Novolin N and Humulin N unless documented allergy (pens and vials) at the same dose and concentration per insurance preference and provider protocol.   -May substitute prescriptions for Humulin and Novolin 70/30 insulin unless documented allergy at the same dose and concentration per insurance preference and provider protocol.    Last completed office visit: 1/10/2025 Rena Almonte MD   Next scheduled Follow up:   Future Appointments   Date Time Provider Department Center   7/11/2025  7:30 AM Rena Almonte MD [facility]      Last A1C result: 10.7% done 1/10/2025.

## 2025-04-07 NOTE — TELEPHONE ENCOUNTER
LM to call clinic     Per LOV dtd 1/10/25:   NPH to 50 units subcutaneous Bedtime and ADD 10 units subcutaneous in AM     CONTINUE Novolog 20 units subcutaneous breakfast 30 units subcutaneous dinner

## 2025-06-04 DIAGNOSIS — E11.65 UNCONTROLLED TYPE 2 DIABETES MELLITUS WITH HYPERGLYCEMIA (HCC): ICD-10-CM

## 2025-06-05 RX ORDER — INSULIN ASPART 100 [IU]/ML
25 INJECTION, SOLUTION INTRAVENOUS; SUBCUTANEOUS
Qty: 45 ML | Refills: 0 | Status: SHIPPED | OUTPATIENT
Start: 2025-06-05

## 2025-06-05 NOTE — TELEPHONE ENCOUNTER
Endocrine refill protocol for rapid acting, regular, intermediate, and mixed insulin:    Protocol Criteria:  FAILED Reason: Elevated A1C    If all below requirements are met, send a 90-day supply with 1 refill per provider protocol.    Verify appointment with Endocrinology completed in the last 6 months or scheduled in the next 3 months.  Verify A1C has been completed within the last 6 months and is below 8.5%    -May substitute prescriptions for Novolog and Humalog unless documented allergy (pens and vials) at the same dose and concentration per insurance preference and provider protocol.   -May substitute prescriptions for Novolin R and Humulin R unless documented allergy (pens and vials) at the same dose and concentration per insurance preference and provider protocol.   -May substitute prescriptions for Novolin N and Humulin N unless documented allergy (pens and vials) at the same dose and concentration per insurance preference and provider protocol.   -May substitute prescriptions for Humulin and Novolin 70/30 insulin unless documented allergy at the same dose and concentration per insurance preference and provider protocol.    Last completed office visit:1/10/2025 Rena Almonte MD   Last completed telemed visit: Visit date not found  Next scheduled Follow up:   Future Appointments   Date Time Provider Department Center   7/11/2025  7:30 AM Rena Almonte MD [facility]      Last A1C result: 10.7% done 1/10/2025.

## 2025-06-20 RX ORDER — METFORMIN HYDROCHLORIDE 500 MG/1
500 TABLET, EXTENDED RELEASE ORAL 2 TIMES DAILY WITH MEALS
Qty: 180 TABLET | Refills: 1 | Status: SHIPPED | OUTPATIENT
Start: 2025-06-20

## 2025-06-20 NOTE — TELEPHONE ENCOUNTER
Endocrine Refill protocol for metformin    Protocol Criteria:  FAILED  Reason: Elevated A1C    If all below requirements are met, send a 90-day supply with 1 refill per provider protocol.     Verify appointment with Endocrinology completed in the last 6 months or scheduled in the next 3 months.  Verify A1C has been completed within the last 6 months and is below 8.5%  Verify last GFR is greater than or equal to 40 in the past 12 months    Last completed office visit:1/10/2025 Rena Almonte MD   Last completed telemed visit: Visit date not found  Next scheduled Follow up:   Future Appointments   Date Time Provider Department Center   7/11/2025  7:30 AM Rena Almonte MD [facility] [place]       Last GFR result:    Lab Results   Component Value Date    EGFRCR 82 01/06/2025     Last A1c result: Last A1C result: 10.7% done 1/10/2025.

## 2025-06-27 ENCOUNTER — TELEPHONE (OUTPATIENT)
Dept: ENDOCRINOLOGY CLINIC | Facility: CLINIC | Age: 65
End: 2025-06-27

## 2025-07-08 ENCOUNTER — TELEPHONE (OUTPATIENT)
Dept: ENDOCRINOLOGY CLINIC | Facility: CLINIC | Age: 65
End: 2025-07-08

## 2025-07-08 DIAGNOSIS — E11.65 UNCONTROLLED TYPE 2 DIABETES MELLITUS WITH HYPERGLYCEMIA (HCC): Primary | ICD-10-CM

## 2025-07-10 ENCOUNTER — RESULTS FOLLOW-UP (OUTPATIENT)
Dept: ENDOCRINOLOGY CLINIC | Facility: CLINIC | Age: 65
End: 2025-07-10

## 2025-07-10 ENCOUNTER — LAB ENCOUNTER (OUTPATIENT)
Dept: LAB | Facility: HOSPITAL | Age: 65
End: 2025-07-10
Payer: MEDICARE

## 2025-07-10 ENCOUNTER — TELEPHONE (OUTPATIENT)
Dept: ENDOCRINOLOGY CLINIC | Facility: CLINIC | Age: 65
End: 2025-07-10

## 2025-07-10 DIAGNOSIS — E11.65 UNCONTROLLED TYPE 2 DIABETES MELLITUS WITH HYPERGLYCEMIA (HCC): ICD-10-CM

## 2025-07-10 LAB
ALBUMIN SERPL-MCNC: 4.5 G/DL (ref 3.2–4.8)
ALBUMIN/GLOB SERPL: 2.1 {RATIO} (ref 1–2)
ALP LIVER SERPL-CCNC: 87 U/L (ref 45–117)
ALT SERPL-CCNC: 23 U/L (ref 10–49)
ANION GAP SERPL CALC-SCNC: 9 MMOL/L (ref 0–18)
AST SERPL-CCNC: 29 U/L (ref ?–34)
BASOPHILS # BLD AUTO: 0.05 X10(3) UL (ref 0–0.2)
BASOPHILS NFR BLD AUTO: 0.5 %
BILIRUB SERPL-MCNC: <0.2 MG/DL (ref 0.2–1.1)
BUN BLD-MCNC: 25 MG/DL (ref 9–23)
BUN/CREAT SERPL: 25.5 (ref 10–20)
CALCIUM BLD-MCNC: 8.5 MG/DL (ref 8.7–10.4)
CHLORIDE SERPL-SCNC: 110 MMOL/L (ref 98–112)
CHOLEST SERPL-MCNC: 109 MG/DL (ref ?–200)
CO2 SERPL-SCNC: 21 MMOL/L (ref 21–32)
CREAT BLD-MCNC: 0.98 MG/DL (ref 0.7–1.3)
CREAT UR-SCNC: 45.6 MG/DL
DEPRECATED RDW RBC AUTO: 51.6 FL (ref 35.1–46.3)
EGFRCR SERPLBLD CKD-EPI 2021: 85 ML/MIN/1.73M2 (ref 60–?)
EOSINOPHIL # BLD AUTO: 0.26 X10(3) UL (ref 0–0.7)
EOSINOPHIL NFR BLD AUTO: 2.8 %
ERYTHROCYTE [DISTWIDTH] IN BLOOD BY AUTOMATED COUNT: 15.5 % (ref 11–15)
FASTING PATIENT LIPID ANSWER: YES
FASTING STATUS PATIENT QL REPORTED: YES
GLOBULIN PLAS-MCNC: 2.1 G/DL (ref 2–3.5)
GLUCOSE BLD-MCNC: 38 MG/DL (ref 70–99)
HCT VFR BLD AUTO: 31.5 % (ref 39–53)
HDLC SERPL-MCNC: 28 MG/DL (ref 40–59)
HGB BLD-MCNC: 10.2 G/DL (ref 13–17.5)
IMM GRANULOCYTES # BLD AUTO: 0.12 X10(3) UL (ref 0–1)
IMM GRANULOCYTES NFR BLD: 1.3 %
LDLC SERPL CALC-MCNC: 58 MG/DL (ref ?–100)
LYMPHOCYTES # BLD AUTO: 1.89 X10(3) UL (ref 1–4)
LYMPHOCYTES NFR BLD AUTO: 20.7 %
MCH RBC QN AUTO: 29.7 PG (ref 26–34)
MCHC RBC AUTO-ENTMCNC: 32.4 G/DL (ref 31–37)
MCV RBC AUTO: 91.6 FL (ref 80–100)
MICROALBUMIN UR-MCNC: 39.8 MG/DL
MICROALBUMIN/CREAT 24H UR-RTO: 872.8 UG/MG (ref ?–30)
MONOCYTES # BLD AUTO: 0.58 X10(3) UL (ref 0.1–1)
MONOCYTES NFR BLD AUTO: 6.3 %
NEUTROPHILS # BLD AUTO: 6.24 X10 (3) UL (ref 1.5–7.7)
NEUTROPHILS # BLD AUTO: 6.24 X10(3) UL (ref 1.5–7.7)
NEUTROPHILS NFR BLD AUTO: 68.4 %
NONHDLC SERPL-MCNC: 81 MG/DL (ref ?–130)
OSMOLALITY SERPL CALC.SUM OF ELEC: 291 MOSM/KG (ref 275–295)
PLATELET # BLD AUTO: 273 10(3)UL (ref 150–450)
POTASSIUM SERPL-SCNC: 5 MMOL/L (ref 3.5–5.1)
PROT SERPL-MCNC: 6.6 G/DL (ref 5.7–8.2)
RBC # BLD AUTO: 3.44 X10(6)UL (ref 3.8–5.8)
SODIUM SERPL-SCNC: 140 MMOL/L (ref 136–145)
TRIGL SERPL-MCNC: 131 MG/DL (ref 30–149)
TSI SER-ACNC: 1.04 UIU/ML (ref 0.55–4.78)
VLDLC SERPL CALC-MCNC: 19 MG/DL (ref 0–30)
WBC # BLD AUTO: 9.1 X10(3) UL (ref 4–11)

## 2025-07-10 PROCEDURE — 82570 ASSAY OF URINE CREATININE: CPT

## 2025-07-10 PROCEDURE — 80053 COMPREHEN METABOLIC PANEL: CPT

## 2025-07-10 PROCEDURE — 82043 UR ALBUMIN QUANTITATIVE: CPT

## 2025-07-10 PROCEDURE — 36415 COLL VENOUS BLD VENIPUNCTURE: CPT

## 2025-07-10 PROCEDURE — 84443 ASSAY THYROID STIM HORMONE: CPT

## 2025-07-10 PROCEDURE — 85025 COMPLETE CBC W/AUTO DIFF WBC: CPT

## 2025-07-10 PROCEDURE — 80061 LIPID PANEL: CPT

## 2025-07-10 NOTE — TELEPHONE ENCOUNTER
MYNOR Obrien    Spoke with pt and told him that his BG was 38 when he had his labs drawn this morning. Pt says he fasted overnight to have labs drawn. He reports blurry vision and seeing double on his way to the lab. He has since eaten breakfast and his BG is in 150s. Symptoms have resolved and pt feels fine. Told pt to call the office if he has any other BG<80. Pt verbalized understanding.

## 2025-07-10 NOTE — TELEPHONE ENCOUNTER
Please call patient - severe hypoglycemia on AM labs. Please make sure he has corrected level. Thanks.

## 2025-07-11 ENCOUNTER — OFFICE VISIT (OUTPATIENT)
Dept: ENDOCRINOLOGY CLINIC | Facility: CLINIC | Age: 65
End: 2025-07-11

## 2025-07-11 VITALS
SYSTOLIC BLOOD PRESSURE: 133 MMHG | HEART RATE: 70 BPM | WEIGHT: 199.81 LBS | DIASTOLIC BLOOD PRESSURE: 67 MMHG | BODY MASS INDEX: 29 KG/M2

## 2025-07-11 DIAGNOSIS — E11.65 UNCONTROLLED TYPE 2 DIABETES MELLITUS WITH HYPERGLYCEMIA (HCC): Primary | ICD-10-CM

## 2025-07-11 DIAGNOSIS — I10 HYPERTENSION, UNSPECIFIED TYPE: ICD-10-CM

## 2025-07-11 DIAGNOSIS — R60.0 BILATERAL LEG EDEMA: ICD-10-CM

## 2025-07-11 LAB
GLUCOSE BLOOD: 126
HEMOGLOBIN A1C: 9 % (ref 4.3–5.6)
TEST STRIP LOT #: NORMAL NUMERIC

## 2025-07-11 PROCEDURE — 82947 ASSAY GLUCOSE BLOOD QUANT: CPT | Performed by: INTERNAL MEDICINE

## 2025-07-11 PROCEDURE — 1160F RVW MEDS BY RX/DR IN RCRD: CPT | Performed by: INTERNAL MEDICINE

## 2025-07-11 PROCEDURE — 1159F MED LIST DOCD IN RCRD: CPT | Performed by: INTERNAL MEDICINE

## 2025-07-11 PROCEDURE — 83036 HEMOGLOBIN GLYCOSYLATED A1C: CPT | Performed by: INTERNAL MEDICINE

## 2025-07-11 PROCEDURE — 99214 OFFICE O/P EST MOD 30 MIN: CPT | Performed by: INTERNAL MEDICINE

## 2025-07-11 RX ORDER — AMLODIPINE BESYLATE 5 MG/1
5 TABLET ORAL DAILY
Qty: 90 TABLET | Refills: 1 | Status: SHIPPED | OUTPATIENT
Start: 2025-07-11

## 2025-07-11 RX ORDER — FUROSEMIDE 20 MG/1
20 TABLET ORAL DAILY
Qty: 30 TABLET | Refills: 0 | Status: SHIPPED | OUTPATIENT
Start: 2025-07-11

## 2025-07-11 NOTE — PROGRESS NOTES
Name: Quinten Ghosh  Date: 2025    Referring Physician: No ref. provider found    HISTORY OF PRESENT ILLNESS   Quinten Ghosh is a 65 year old male who presents for diabetes mellitus diagnosed in .  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       Since last visit he did have severe nocturnal hypoglycemic leading to EMS evaluation.  His glucose was in 30s treated at home, no ED evaluation.     Prior HbA, C or glycohemoglobin were 12.4% 2019; 9.4% 10/2019; 11.0% 2020; 10.1% 2020; 9.9% 2021; 10.1% 2021; 10.5% 2022; 10.4% 10/2022; 9.1% 2023; 11.7% 2023; 11.3% 2024; 10.7% 2025; 9.0% POC Today      Dietary compliance: Poor  Exercise: No  Polyuria/polydipsia: No  Blurred vision: No    Episodes of hypoglycemia: Yes, two episodes of severe hypoglycemia  Blood Glucose:  Checking once daily  Fastin,130-150    Medications for DM   Novolog 15-20 units subcutaneous QAM; 40 units subcutaneous dinner   NPH 50 units subcutaneous Bedtime  and 15 units subcutaneous QAM   Metformin 500mg in AM     Basaglar and Toujeo stopped due to cost   He is not taking Creon.     REVIEW OF SYSTEMS  The ROS was updated during office visit 2025 and updates noted below and in the HPI.     Eyes: Diabetic retinopathy present: No            Most recent visit to eye doctor in last 12 months: No - last visit 2 years     CV: Cardiovascular disease present: No         Hypertension present: No         Hyperlipidemia present: Yes, Gemfibrozil only taking in AM          Peripheral Vascular Disease present: No    : Nephropathy present: No    Neuro: Neuropathy present: No, he does note increased swelling     Skin: Infection or ulceration: No    Osteoporosis: No    Thyroid disease: No    #2 Anemia     He is taking Iron supplementation daily.  Discussed CLN but he declined.       Medications:     Current Outpatient Medications:     METFORMIN  MG Oral Tablet 24 Hr, TAKE 1 TABLET BY  MOUTH TWICE DAILY WITH MEALS, Disp: 180 tablet, Rfl: 1    NOVOLOG FLEXPEN 100 UNIT/ML Subcutaneous Solution Pen-injector, INJECT 25 UNITS SUBCUTANEOUSLY THREE TIMES DAILY BEFORE MEAL(S), Disp: 45 mL, Rfl: 0    Insulin Pen Needle (BD PEN NEEDLE MARTIN U/F) 32G X 4 MM Does not apply Misc, Inject 4 times per day, Disp: 400 each, Rfl: 1    GEMFIBROZIL 600 MG Oral Tab, TAKE 1 TABLET BY MOUTH IN THE MORNING AND 1 AT BEDTIME, Disp: 180 tablet, Rfl: 0    amLODIPine 10 MG Oral Tab, Take 1 tablet (10 mg total) by mouth daily., Disp: 90 tablet, Rfl: 1    losartan 100 MG Oral Tab, Take 1 tablet (100 mg total) by mouth daily., Disp: 90 tablet, Rfl: 1    NOVOLOG FLEXPEN 100 UNIT/ML Subcutaneous Solution Pen-injector, Inject 10 units before breakfast, 20 units before dinner, Disp: 30 mL, Rfl: 1    Insulin NPH Human, Isophane, (RELION N SC), Inject 20 Units into the skin nightly., Disp: , Rfl:     Minocycline HCl 100 MG Oral Cap, Take 1 capsule (100 mg total) by mouth 2 (two) times daily., Disp: , Rfl:     Insulin Glargine, 2 Unit Dial, (TOUJEO MAX SOLOSTAR) 300 UNIT/ML Subcutaneous Solution Pen-injector, Inject 60 Units into the skin daily. (Patient not taking: Reported on 7/11/2025), Disp: 9 mL, Rfl: 0     Allergies:   Allergies   Allergen Reactions    Radiology Contrast Iodinated Dyes HIVES    Sulfur-Salicylic Acid [Salicylic Acid-Sulfur] HIVES       Social History:   Social History     Socioeconomic History    Marital status:    Tobacco Use    Smoking status: Every Day       Medical History:   Past Medical History:    Diabetes (HCC)       Surgical history:   No past surgical history on file.      PHYSICAL EXAM   /67   Pulse 70   Wt 199 lb 12.8 oz (90.6 kg)   BMI 28.67 kg/m²     General Appearance:  alert, well developed, in no acute distress  Eyes:  normal conjunctivae, sclera., normal sclera and normal pupils  Ears/Nose/Mouth/Throat/Neck:  no palpable thyroid nodules or cervical lymphadenopathy  Back: no kyphosis  or back tenderness  Musculoskeletal:  normal muscle strength and tone  Skin:  normal moisture and skin texture  Hair & Nails:  normal scalp hair     Hematologic:  no excessive bruising  Neuro:  sensory grossly intact and motor grossly intact  Psychiatric:  oriented to time, self, and place  Nutritional:  no abnormal weight gain or loss  Extremities: 2+ LE pitting edema       ASSESSMENT/PLAN:      1. Diabetes Mellitus Type 1, Uncontrolled  -Uncontrolled, HgA1c 9.0% -->improved   -He is consistent with insulin but no frequent SBGM   -He did not like CGM   -He has transitioned back to NPH insulin due to cost   -Discussed importance of glycemic control to prevent complications of diabetes  -Discussed complications of diabetes include retinopathy, neuropathy, nephropathy and cardiovascular disease  -Discussed importance of SBGM  -Change NPH 10 units subcutaneous QAM; 45 units subcutaneous at bedtime  -Continue Novolog but discussed timing of insulin before meals and NOT after   -He is taking large dose of Novolog at bedtime causing AM hypoglycemia, discussed at length importance of taking before dinner  -Decrease Novolog 15-20 units subcutaneous QAM; 30 units subcutaneous before dinner  -Verbalized understanding of risks and benefits  -He didn't like CGM   -Normotensive   -Normal lipids   -Normal foot exam performed 1/2025   -labs stable   -Discussed Colonoscopy again today -->he will consider given worsening anemia   -Discussed given anemia high risk for colon CA but he declined intervention     2. Hypertriglyceridemia  -Continue Gemfibrozil   -Lipids controlled     3. Hypertension  -Continue Losartan 100mg PO daily  -Decrease Amlodipine to 5mg PO daily  -Start Lasix 20mg PO daily given new LE edema, verbalized understanding of risks and benefits  -Sent update on swelling in one month       RTC 6 months (due to trouble affording visits)    7/11/2025  Rena Almonte MD

## 2025-07-11 NOTE — PATIENT INSTRUCTIONS
DECREASE Novolog 20 units subcutaneous in AM and 30 units subcutaneous dinner    DECREASE NPH 15 units in AM and 45 units subcutaneous QPM    DECREASE Amlodipine to 5mg daily     START Furosemide 20mg daily in AM     CONTINUE Losartan

## 2025-08-02 DIAGNOSIS — E11.65 UNCONTROLLED TYPE 2 DIABETES MELLITUS WITH HYPERGLYCEMIA (HCC): ICD-10-CM

## 2025-08-02 RX ORDER — LOSARTAN POTASSIUM 100 MG/1
100 TABLET ORAL DAILY
Qty: 90 TABLET | Refills: 0 | Status: SHIPPED | OUTPATIENT
Start: 2025-08-02

## 2025-08-02 RX ORDER — FUROSEMIDE 20 MG/1
20 TABLET ORAL DAILY
Qty: 30 TABLET | Refills: 0 | Status: SHIPPED | OUTPATIENT
Start: 2025-08-02

## 2025-08-02 RX ORDER — INSULIN ASPART 100 [IU]/ML
25 INJECTION, SOLUTION INTRAVENOUS; SUBCUTANEOUS
Qty: 45 ML | Refills: 0 | Status: SHIPPED | OUTPATIENT
Start: 2025-08-02

## (undated) NOTE — LETTER
7/29/2019              Disha Summers        [address] [facility] [id number]         Dear Sridhar Villarreal,    [id number] records indicate that the repeat blood tests ordered for you by Sharron Clark MD  have not been done.   If you have, in fact, already comp